# Patient Record
Sex: FEMALE | Race: WHITE | URBAN - METROPOLITAN AREA
[De-identification: names, ages, dates, MRNs, and addresses within clinical notes are randomized per-mention and may not be internally consistent; named-entity substitution may affect disease eponyms.]

---

## 2019-01-21 ENCOUNTER — OFFICE VISIT (OUTPATIENT)
Dept: FAMILY MEDICINE CLINIC | Age: 29
End: 2019-01-21

## 2019-01-21 VITALS
WEIGHT: 241 LBS | SYSTOLIC BLOOD PRESSURE: 123 MMHG | TEMPERATURE: 98.5 F | RESPIRATION RATE: 17 BRPM | BODY MASS INDEX: 40.15 KG/M2 | DIASTOLIC BLOOD PRESSURE: 82 MMHG | OXYGEN SATURATION: 98 % | HEIGHT: 65 IN | HEART RATE: 96 BPM

## 2019-01-21 DIAGNOSIS — F41.1 GAD (GENERALIZED ANXIETY DISORDER): ICD-10-CM

## 2019-01-21 DIAGNOSIS — J45.40 MODERATE PERSISTENT ASTHMA WITHOUT COMPLICATION: Primary | ICD-10-CM

## 2019-01-21 DIAGNOSIS — E66.01 OBESITY, MORBID (HCC): ICD-10-CM

## 2019-01-21 DIAGNOSIS — F41.0 PANIC ATTACK: ICD-10-CM

## 2019-01-21 RX ORDER — CLONAZEPAM 0.5 MG/1
0.5 TABLET ORAL 2 TIMES DAILY
COMMUNITY

## 2019-01-21 RX ORDER — IPRATROPIUM BROMIDE AND ALBUTEROL SULFATE 2.5; .5 MG/3ML; MG/3ML
3 SOLUTION RESPIRATORY (INHALATION)
COMMUNITY
End: 2019-01-21

## 2019-01-21 RX ORDER — BUDESONIDE AND FORMOTEROL FUMARATE DIHYDRATE 160; 4.5 UG/1; UG/1
1 AEROSOL RESPIRATORY (INHALATION) 2 TIMES DAILY
Qty: 1 INHALER | Refills: 2 | Status: SHIPPED | OUTPATIENT
Start: 2019-01-21 | End: 2019-07-15 | Stop reason: SDUPTHER

## 2019-01-21 RX ORDER — IPRATROPIUM BROMIDE AND ALBUTEROL SULFATE 2.5; .5 MG/3ML; MG/3ML
3 SOLUTION RESPIRATORY (INHALATION)
Qty: 60 NEBULE | Refills: 2 | Status: CANCELLED | OUTPATIENT
Start: 2019-01-21

## 2019-01-21 RX ORDER — HYDROXYZINE PAMOATE 50 MG/1
50 CAPSULE ORAL
COMMUNITY
End: 2022-09-26

## 2019-01-21 RX ORDER — HYDROXYZINE PAMOATE 50 MG/1
50 CAPSULE ORAL
Qty: 120 CAP | Refills: 1 | Status: CANCELLED | OUTPATIENT
Start: 2019-01-21

## 2019-01-21 RX ORDER — ALBUTEROL SULFATE 90 UG/1
1 AEROSOL, METERED RESPIRATORY (INHALATION)
Qty: 1 INHALER | Refills: 1 | Status: SHIPPED | OUTPATIENT
Start: 2019-01-21 | End: 2019-10-07 | Stop reason: SDUPTHER

## 2019-01-21 NOTE — PROGRESS NOTES
[address]     Chief Complaint   Patient presents with    Establish Care    Asthma    Anxiety     Vitals:    01/21/19 1406   BP: 123/82   Pulse: 96   Resp: 17   Temp: 98.5 °F (36.9 °C)   TempSrc: Oral   SpO2: 98%   Weight: 241 lb (109.3 kg)   Height: 5' 5\" (1.651 m)   PainSc:   0 - No pain         HPI: Patient is here to establish care with new PCP, and she would like to address her asthma, she recently received insurance, she has been taking DuoNeb nebulizer to treat her asthma symptoms, she never used  steroid inhalers. Asthma is a moderate persistent, her symptoms are always present she was coughing with wheezing. 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 She has been stable on clonazepam 0.5 mg twice daily and Vistaril 50 mg twice daily. Patient did not want to use SSRIs since she has tried couple and they cause her to have many symptoms. Past Medical History:   Diagnosis Date    Anxiety     Asthma     Psychotic disorder ([facility] [address]      History reviewed. No pertinent surgical history. Social History     Tobacco Use    Smoking status: Never Smoker    Smokeless tobacco: Never Used   Substance Use Topics    Alcohol use: No     Frequency: Never     Comment: occasionally       Family History   Problem Relation Age of Onset    Hypertension Mother        Review of Systems   Constitutional: Negative for chills, fever, malaise/fatigue and weight loss. HENT: Negative for congestion, ear discharge, ear pain, hearing loss, nosebleeds, sinus pain and sore throat. Eyes: Negative for blurred vision, double vision and discharge. Respiratory: Positive for cough, shortness of breath and wheezing. Cardiovascular: Negative for chest pain, palpitations, claudication and leg swelling. Gastrointestinal: Negative for abdominal pain, constipation, diarrhea, nausea and vomiting. Genitourinary: Negative for dysuria, frequency, hematuria and urgency. Musculoskeletal: Negative for myalgias. Skin: Negative for itching and rash. Neurological: Negative for dizziness, tingling, sensory change, speech change, focal weakness, seizures, weakness and headaches. Psychiatric/Behavioral: Negative for depression, substance abuse and suicidal ideas. The patient is nervous/anxious. Physical Exam   Constitutional: She is oriented to person, place, and time. She appears well-developed and well-nourished. No distress. HENT:   Head: Normocephalic and atraumatic. Mouth/Throat: Oropharynx is clear and moist. No oropharyngeal exudate. Eyes: Conjunctivae are normal. Pupils are equal, round, and reactive to light. Right eye exhibits no discharge. Left eye exhibits no discharge. No scleral icterus. Neck: Neck supple. No thyromegaly present. Cardiovascular: Normal rate, regular rhythm and normal heart sounds. No murmur heard. Pulmonary/Chest: Effort normal. No respiratory distress. She has wheezes. She has no rales. She exhibits no tenderness. Abdominal: Soft. She exhibits no distension. There is no tenderness. There is no rebound. Musculoskeletal: Normal range of motion. She exhibits no edema, tenderness or deformity. Lymphadenopathy:     She has no cervical adenopathy. Neurological: She is alert and oriented to person, place, and time. No cranial nerve deficit. Coordination normal.   Skin: Skin is warm and dry. No rash noted. She is not diaphoretic. No erythema. No pallor. Psychiatric: She has a normal mood and affect. Her behavior is normal. Judgment and thought content normal.   Nursing note and vitals reviewed. Assessment and plan     Plan of care has been discussed with the patient, he agrees to the plan and verbalized understanding.   All his questions were answered  More than 50% of the time spent in this visit was counseling the patient about  illness and treatment options         1. Moderate persistent asthma without complication    Advised to use Symbicort twice daily and before as needed follow-up in 1 month for reevaluation  - budesonide-formoterol (SYMBICORT) 160-4.5 mcg/actuation HFAA; Take 1 Puff by inhalation two (2) times a day. Dispense: 1 Inhaler; Refill: 2  - albuterol (PROVENTIL HFA, VENTOLIN HFA, PROAIR HFA) 90 mcg/actuation inhaler; Take 1 Puff by inhalation every six (6) hours as needed for Wheezing. Dispense: 1 Inhaler; Refill: 1    2. ERIC (generalized anxiety disorder)    - REFERRAL TO PSYCHIATRY    3. Panic attack    - REFERRAL TO PSYCHIATRY    4. Obesity, morbid ([facility] [address]  I have reviewed/discussed the above normal BMI with the patient. I have recommended the following interventions: dietary management education, guidance, and counseling, encourage exercise and monitor weight . Annamary Ripa Current Outpatient Medications   Medication Sig Dispense Refill    clonazePAM (KLONOPIN) 0.5 mg tablet Take  by mouth two (2) times a day.  hydrOXYzine pamoate (VISTARIL) 50 mg capsule Take 50 mg by mouth three (3) times daily as needed for Anxiety.  budesonide-formoterol (SYMBICORT) 160-4.5 mcg/actuation HFAA Take 1 Puff by inhalation two (2) times a day. 1 Inhaler 2    albuterol (PROVENTIL HFA, VENTOLIN HFA, PROAIR HFA) 90 mcg/actuation inhaler Take 1 Puff by inhalation every six (6) hours as needed for Wheezing. 1 Inhaler 1       Patient Active Problem List    Diagnosis Date Noted    Obesity, morbid ([address] 01/21/2019    ERIC (generalized anxiety disorder) 01/21/2019    Panic attack 01/21/2019    Moderate persistent asthma without complication 84/78/2975     No results found for this or any previous visit. No results found for any previous visit. Follow-up Disposition:  Return in about 1 month (around 2/21/2019).

## 2019-01-21 NOTE — PROGRESS NOTES
Donny Funes is a 33 y.o. female presents to office for establish care, asthma, anxiety    Medication list has been reviewed with Donny Funes and updated as of today's date     Health Maintenance items with a due date reviewed with patient:  Health Maintenance Due   Topic Date Due    DTaP/Tdap/Td series (1 - Tdap) 10/26/2010    PAP AKA CERVICAL CYTOLOGY  10/26/2010    Influenza Age 9 to Adult  08/01/2018    MEDICARE YEARLY EXAM  01/17/2019

## 2019-02-25 ENCOUNTER — HOSPITAL ENCOUNTER (OUTPATIENT)
Dept: LAB | Age: 29
Discharge: HOME | End: 2019-02-25
Payer: COMMERCIAL

## 2019-02-25 ENCOUNTER — OFFICE VISIT (OUTPATIENT)
Dept: FAMILY MEDICINE CLINIC | Age: 29
End: 2019-02-25

## 2019-02-25 VITALS
HEART RATE: 102 BPM | WEIGHT: 239.6 LBS | HEIGHT: 65 IN | BODY MASS INDEX: 39.92 KG/M2 | TEMPERATURE: 97.5 F | DIASTOLIC BLOOD PRESSURE: 83 MMHG | SYSTOLIC BLOOD PRESSURE: 130 MMHG | OXYGEN SATURATION: 94 %

## 2019-02-25 DIAGNOSIS — J45.40 MODERATE PERSISTENT ASTHMA WITHOUT COMPLICATION: ICD-10-CM

## 2019-02-25 DIAGNOSIS — J02.9 SORE THROAT: Primary | ICD-10-CM

## 2019-02-25 DIAGNOSIS — J02.9 PHARYNGITIS, UNSPECIFIED ETIOLOGY: ICD-10-CM

## 2019-02-25 DIAGNOSIS — F41.1 GAD (GENERALIZED ANXIETY DISORDER): ICD-10-CM

## 2019-02-25 LAB
S PYO AG THROAT QL: NEGATIVE
VALID INTERNAL CONTROL?: YES

## 2019-02-25 PROCEDURE — 87070 CULTURE OTHR SPECIMN AEROBIC: CPT

## 2019-02-25 NOTE — PROGRESS NOTES
Ezra Bautista is a 33 y.o. female presents in office follow up from symbicort, sore throat, itchy/red, and eye discharge x 2 1/2 weeks Health Maintenance Due Topic Date Due  Pneumococcal 19-64 Medium Risk (1 of 1 - PPSV23) 10/26/2008  DTaP/Tdap/Td series (1 - Tdap) 10/26/2010  PAP AKA CERVICAL CYTOLOGY  10/26/2010  Influenza Age 5 to Adult  08/01/2018 1. Have you been to the ER, urgent care clinic since your last visit? Hospitalized since your last visit? Yes When: 2/5/2019 to Urgent Care for Strep 2. Have you seen or consulted any other health care providers outside of the [address] [place] [address] since your last visit? Include any pap smears or colon screening.  No

## 2019-02-25 NOTE — PROGRESS NOTES
Jie Real Chief Complaint Patient presents with  Follow-up  
  from being on symbicort Vitals:  
 02/25/19 1020 BP: 130/83 Pulse: (!) 102 Temp: 97.5 °F (36.4 °C) TempSrc: Oral  
SpO2: 94% Weight: 239 lb 9.6 oz (108.7 kg) Height: 5' 5\" (1.651 m) PainSc:   3 PainLoc: Throat HPI: Patient here to follow-up on asthma and says she has been started on Symbicort 1 inhaler twice daily, patient has been doing much better she barely use her DuoNeb nebulizer, and she had used albuterol inhaler once or twice a week. She has been having sore throat started earlier this months and she visited 1 of the urgent care and she was diagnosed with a strep infection was treated with penicillin tablets for 7 days, patient symptoms got better but a few days later she started having sore throat pain with swallowing body aches no fever no nausea no vomiting no chills. Regarding her anxiety patient did get a hold of her previous provider  Sal Valadez . Past Medical History:  
Diagnosis Date  Anxiety  Asthma  Psychotic disorder ([facility] [address] History reviewed. No pertinent surgical history. Social History Tobacco Use  Smoking status: Never Smoker  Smokeless tobacco: Never Used Substance Use Topics  Alcohol use: Yes Frequency: Never Comment: occasionally Family History Problem Relation Age of Onset  Hypertension Mother  Anxiety Mother  Anxiety Father  Alcohol abuse Father Review of Systems Constitutional: Negative for chills, fever, malaise/fatigue and weight loss. HENT: Positive for sore throat. Negative for congestion, ear discharge, ear pain, hearing loss, nosebleeds and sinus pain. Eyes: Negative for blurred vision, double vision and discharge. Respiratory: Positive for cough and wheezing. Negative for hemoptysis. Cardiovascular: Negative for chest pain, palpitations, claudication and leg swelling. Gastrointestinal: Negative for abdominal pain, constipation, diarrhea, nausea and vomiting. Genitourinary: Negative for dysuria, frequency and urgency. Musculoskeletal: Negative for back pain, joint pain, myalgias and neck pain. Skin: Negative for itching and rash. Neurological: Negative for dizziness, tingling, sensory change, speech change, focal weakness, weakness and headaches. Psychiatric/Behavioral: Negative for depression, hallucinations, substance abuse and suicidal ideas. The patient is not nervous/anxious. Physical Exam  
Constitutional: She is oriented to person, place, and time. She appears well-developed and well-nourished. No distress. HENT:  
Head: Normocephalic and atraumatic. Mouth/Throat: Oropharynx is clear and moist. No oropharyngeal exudate. Bilateral throat erythema no exudates Eyes: Conjunctivae are normal. Pupils are equal, round, and reactive to light. Right eye exhibits no discharge. Left eye exhibits no discharge. No scleral icterus. Neck: Normal range of motion. Neck supple. No thyromegaly present. Left-sided submandibular lymph node tenderness Cardiovascular: Normal rate, regular rhythm and normal heart sounds. No murmur heard. Pulmonary/Chest: Effort normal and breath sounds normal. No respiratory distress. She has no wheezes. She has no rales. She exhibits no tenderness. Abdominal: Soft. She exhibits no distension. There is no tenderness. There is no rebound. Musculoskeletal: Normal range of motion. She exhibits no edema, tenderness or deformity. Lymphadenopathy:  
  She has no cervical adenopathy. Neurological: She is alert and oriented to person, place, and time. No cranial nerve deficit. Coordination normal.  
Skin: Skin is warm and dry. No rash noted. She is not diaphoretic. No erythema. No pallor. Psychiatric: She has a normal mood and affect.  Her behavior is normal. Judgment and thought content normal.  
 Nursing note and vitals reviewed. Assessment and plan  
 
Plan of care has been discussed with the patient, he agrees to the plan and verbalized understanding. All his questions were answered More than 50% of the time spent in this visit was counseling the patient about  illness and treatment options 1. Sore throat Culture will be sent strep a rapid antigen is negative - AMB POC RAPID STREP A 
- THROAT CULTURE; Future - THROAT CULTURE 2. Moderate persistent asthma without complication Stable continue Symbicort twice daily 3. ERIC (generalized anxiety disorder) Be following up with her psychiatrist 
 
4. Pharyngitis, unspecified etiology Strep antigen negative we will send for culture patient advised to take lozenges gargle salt and water and stay well-hydrated with 3 L a day of water. Current Outpatient Medications Medication Sig Dispense Refill  clonazePAM (KLONOPIN) 0.5 mg tablet Take  by mouth two (2) times a day.  hydrOXYzine pamoate (VISTARIL) 50 mg capsule Take 50 mg by mouth three (3) times daily as needed for Anxiety.  budesonide-formoterol (SYMBICORT) 160-4.5 mcg/actuation HFAA Take 1 Puff by inhalation two (2) times a day. 1 Inhaler 2  
 albuterol (PROVENTIL HFA, VENTOLIN HFA, PROAIR HFA) 90 mcg/actuation inhaler Take 1 Puff by inhalation every six (6) hours as needed for Wheezing. 1 Inhaler 1 Patient Active Problem List  
 Diagnosis Date Noted  Obesity, morbid ([facility] [address] 01/21/2019  ERIC (generalized anxiety disorder) 01/21/2019  Panic attack 01/21/2019  Moderate persistent asthma without complication 37/76/1141 Results for orders placed or performed in visit on 02/25/19 AMB POC RAPID STREP A Result Value Ref Range VALID INTERNAL CONTROL POC Yes Group A Strep Ag Negative Negative Office Visit on 02/25/2019 Component Date Value Ref Range Status  VALID INTERNAL CONTROL POC 02/25/2019 Yes   Final  
  Group A Strep Ag 02/25/2019 Negative  Negative Final  
  
 
 
Follow-up Disposition: Not on File

## 2019-02-27 LAB
BACTERIA SPEC CULT: NORMAL
BACTERIA SPEC CULT: NORMAL
SERVICE CMNT-IMP: NORMAL

## 2019-03-07 ENCOUNTER — TELEPHONE (OUTPATIENT)
Dept: FAMILY MEDICINE CLINIC | Age: 29
End: 2019-03-07

## 2019-03-07 NOTE — TELEPHONE ENCOUNTER
Attempted to contact patient but no answer, message left on VM. Will try again later.  Thank you  Ivania Collier LPN

## 2019-03-07 NOTE — TELEPHONE ENCOUNTER
----- Message from Braxton Todd MD sent at 2/28/2019  7:49 PM EST -----  Normal throat culture negative for infection

## 2019-07-15 DIAGNOSIS — J45.40 MODERATE PERSISTENT ASTHMA WITHOUT COMPLICATION: ICD-10-CM

## 2019-07-15 NOTE — TELEPHONE ENCOUNTER
Pt calling to request med refill of:  Requested Prescriptions     Pending Prescriptions Disp Refills    budesonide-formoterol (SYMBICORT) 160-4.5 mcg/actuation HFAA 1 Inhaler 2     Sig: Take 1 Puff by inhalation two (2) times a day. Be sent to [facility]    Pt has 7 day remaining     Pts last appt was 2/25/19  & No f/u scheduled    Pt advised of 72 hour time frame. Please advise.

## 2019-07-16 RX ORDER — BUDESONIDE AND FORMOTEROL FUMARATE DIHYDRATE 160; 4.5 UG/1; UG/1
1 AEROSOL RESPIRATORY (INHALATION) 2 TIMES DAILY
Qty: 1 INHALER | Refills: 2 | Status: SHIPPED | OUTPATIENT
Start: 2019-07-16 | End: 2020-01-20 | Stop reason: SDUPTHER

## 2019-10-07 ENCOUNTER — OFFICE VISIT (OUTPATIENT)
Dept: FAMILY MEDICINE CLINIC | Age: 29
End: 2019-10-07

## 2019-10-07 VITALS
WEIGHT: 235 LBS | DIASTOLIC BLOOD PRESSURE: 82 MMHG | OXYGEN SATURATION: 96 % | SYSTOLIC BLOOD PRESSURE: 121 MMHG | RESPIRATION RATE: 18 BRPM | HEIGHT: 65 IN | BODY MASS INDEX: 39.15 KG/M2 | TEMPERATURE: 99 F | HEART RATE: 83 BPM

## 2019-10-07 DIAGNOSIS — J45.40 MODERATE PERSISTENT ASTHMA WITHOUT COMPLICATION: ICD-10-CM

## 2019-10-07 DIAGNOSIS — F41.0 PANIC ATTACK: ICD-10-CM

## 2019-10-07 DIAGNOSIS — E28.2 PCOS (POLYCYSTIC OVARIAN SYNDROME): ICD-10-CM

## 2019-10-07 DIAGNOSIS — F41.1 GAD (GENERALIZED ANXIETY DISORDER): Primary | ICD-10-CM

## 2019-10-07 DIAGNOSIS — E66.01 OBESITY, MORBID (HCC): ICD-10-CM

## 2019-10-07 RX ORDER — NORGESTIMATE AND ETHINYL ESTRADIOL 0.25-0.035
KIT ORAL
Refills: 5 | COMMUNITY
Start: 2019-09-26

## 2019-10-07 RX ORDER — METFORMIN HYDROCHLORIDE 500 MG/1
TABLET, EXTENDED RELEASE ORAL
Refills: 1 | COMMUNITY
Start: 2019-09-26 | End: 2020-08-19

## 2019-10-07 NOTE — PROGRESS NOTES
[address]     Chief Complaint   Patient presents with    Asthma     f/u    Anxiety     f/u     Vitals:    10/07/19 0927   BP: 121/82   Pulse: 83   Resp: 18   Temp: 99 °F (37.2 °C)   TempSrc: Oral   SpO2: 96%   Weight: 235 lb (106.6 kg)   Height: 5' 5\" (1.651 m)   PainSc:   0 - No pain   LMP: 09/18/2019         HPI: Patient is here for follow-up medication refill on albuterol takes as needed for asthma, her asthma is mild intermittent, patient has no acute complaint today. She does have anxiety disorder she is on clonazepam 0.5 mg as needed as well as vistaril. Breasts are also help with her allergy symptoms. Patient was diagnosed with polycystic ovarian syndrome at [facility], requested to get record a release of information to be signed. Patient is due for Pap smear she will schedule one . For next visit    Past Medical History:   Diagnosis Date    Anxiety     Asthma     Psychotic disorder ([facility] [address]      History reviewed. No pertinent surgical history. Social History     Tobacco Use    Smoking status: Never Smoker    Smokeless tobacco: Never Used   Substance Use Topics    Alcohol use: Yes     Frequency: Never     Comment: occasionally       Family History   Problem Relation Age of Onset    Hypertension Mother     Anxiety Mother     Anxiety Father     Alcohol abuse Father        Review of Systems   Constitutional: Negative for chills, fever, malaise/fatigue and weight loss. HENT: Negative for congestion, ear discharge, ear pain, hearing loss, nosebleeds, sinus pain and sore throat. Eyes: Negative for blurred vision, double vision and discharge. Respiratory: Negative for cough, hemoptysis, sputum production, shortness of breath and wheezing. Cardiovascular: Negative for chest pain, palpitations, claudication and leg swelling. Gastrointestinal: Negative for abdominal pain, blood in stool, constipation, diarrhea, nausea and vomiting.    Genitourinary: Negative for dysuria, frequency, hematuria and urgency. Musculoskeletal: Negative for back pain, myalgias and neck pain. Skin: Negative for itching and rash. Neurological: Negative for dizziness, tingling, sensory change, speech change, focal weakness, seizures, weakness and headaches. Psychiatric/Behavioral: Negative for depression, hallucinations, substance abuse and suicidal ideas. The patient is not nervous/anxious and does not have insomnia. Physical Exam   Constitutional: She is oriented to person, place, and time. She appears well-developed and well-nourished. No distress. HENT:   Head: Normocephalic and atraumatic. Mouth/Throat: Oropharynx is clear and moist. No oropharyngeal exudate. Eyes: Pupils are equal, round, and reactive to light. Conjunctivae are normal. Right eye exhibits no discharge. Left eye exhibits no discharge. No scleral icterus. Neck: No thyromegaly present. Cardiovascular: Normal rate, regular rhythm and normal heart sounds. No murmur heard. Pulmonary/Chest: Effort normal and breath sounds normal. No respiratory distress. She has no wheezes. She has no rales. She exhibits no tenderness. Abdominal: Soft. She exhibits no distension. There is no tenderness. There is no rebound. Musculoskeletal: Normal range of motion. She exhibits no edema, tenderness or deformity. Lymphadenopathy:     She has no cervical adenopathy. Neurological: She is alert and oriented to person, place, and time. No cranial nerve deficit. Coordination normal.   Skin: Skin is warm and dry. No rash noted. She is not diaphoretic. No erythema. No pallor. Psychiatric: She has a normal mood and affect. Her behavior is normal. Judgment and thought content normal.   Nursing note and vitals reviewed. Assessment and plan     Plan of care has been discussed with the patient, he agrees to the plan and verbalized understanding.   All his questions were answered  More than 50% of the time spent in this visit was counseling the patient about  illness and treatment options         1. ERIC (generalized anxiety disorder)      2. Obesity, morbid ([address]    3. Panic attack      4. PCOS (polycystic ovarian syndrome)    5. Moderate persistent asthma without complication    - albuterol (PROVENTIL HFA, VENTOLIN HFA, PROAIR HFA) 90 mcg/actuation inhaler; Take 1 Puff by inhalation every six (6) hours as needed for Wheezing. Dispense: 1 Inhaler; Refill: 3    Current Outpatient Medications   Medication Sig Dispense Refill    SPRINTEC, 28, 0.25-35 mg-mcg tab TAKE 1 TABLET BY MOUTH ONCE DAILY  5    metFORMIN ER (GLUCOPHAGE XR) 500 mg tablet TAKE 1 TABLET BY MOUTH ONCE DAILY WITH EVENING MEAL  1    budesonide-formoterol (SYMBICORT) 160-4.5 mcg/actuation HFAA Take 1 Puff by inhalation two (2) times a day. 1 Inhaler 2    clonazePAM (KLONOPIN) 0.5 mg tablet Take 0.5 mg by mouth two (2) times a day.  hydrOXYzine pamoate (VISTARIL) 50 mg capsule Take 50 mg by mouth three (3) times daily as needed for Anxiety.  albuterol (PROVENTIL HFA, VENTOLIN HFA, PROAIR HFA) 90 mcg/actuation inhaler Take 1 Puff by inhalation every six (6) hours as needed for Wheezing. 1 Inhaler 1       Patient Active Problem List    Diagnosis Date Noted    Obesity, morbid ([address] 01/21/2019    ERIC (generalized anxiety disorder) 01/21/2019    Panic attack 01/21/2019    Moderate persistent asthma without complication 87/50/1379     Results for orders placed or performed during the hospital encounter of 02/25/19   THROAT CULTURE   Result Value Ref Range    Special Requests: NO SPECIAL REQUESTS      Culture result: NO BETA HEMOLYTIC STREPTOCOCCUS GROUP A ISOLATED      Culture result: MODERATE NORMAL RESPIRATORY NAHID       No visits with results within 3 Month(s) from this visit.    Latest known visit with results is:   Hospital Outpatient Visit on 02/25/2019   Component Date Value Ref Range Status    Special Requests: 02/25/2019 NO SPECIAL REQUESTS    Final    Culture result: 02/25/2019 NO BETA HEMOLYTIC STREPTOCOCCUS GROUP A ISOLATED    Final    Culture result: 02/25/2019 MODERATE NORMAL RESPIRATORY NAHID    Final          Follow-up and Dispositions    · Return for for annual physical, for well women exam.

## 2019-10-07 NOTE — PROGRESS NOTES
Tessie Smiley is a 33 y.o. female presents in office for asthma and anxiety. Health Maintenance Due   Topic Date Due    Pneumococcal 0-64 years (1 of 1 - PPSV23) 10/26/1995    DTaP/Tdap/Td series (1 - Tdap) 10/26/2010    PAP AKA CERVICAL CYTOLOGY  10/26/2010       1. Have you been to the ER, urgent care clinic since your last visit? Hospitalized since your last visit? No    2. Have you seen or consulted any other health care providers outside of the [facility] since your last visit? Include any pap smears or colon screening. No    Patient follows with gyn for pap smear at [address] [place] [address]. She has an appointment next month. She declines flu vaccine today.

## 2019-10-08 RX ORDER — ALBUTEROL SULFATE 90 UG/1
1 AEROSOL, METERED RESPIRATORY (INHALATION)
Qty: 1 INHALER | Refills: 3 | Status: SHIPPED | OUTPATIENT
Start: 2019-10-08 | End: 2020-08-14 | Stop reason: SDUPTHER

## 2020-01-20 DIAGNOSIS — J45.40 MODERATE PERSISTENT ASTHMA WITHOUT COMPLICATION: ICD-10-CM

## 2020-01-20 RX ORDER — BUDESONIDE AND FORMOTEROL FUMARATE DIHYDRATE 160; 4.5 UG/1; UG/1
1 AEROSOL RESPIRATORY (INHALATION) 2 TIMES DAILY
Qty: 1 INHALER | Refills: 2 | Status: SHIPPED | OUTPATIENT
Start: 2020-01-20 | End: 2020-04-30

## 2020-01-20 NOTE — TELEPHONE ENCOUNTER
Patient called requesting medication refill. Please advise. Requested Prescriptions     Pending Prescriptions Disp Refills    budesonide-formoteroL (SYMBICORT) 160-4.5 mcg/actuation HFAA 1 Inhaler 2     Sig: Take 1 Puff by inhalation two (2) times a day.

## 2020-04-30 DIAGNOSIS — J45.40 MODERATE PERSISTENT ASTHMA WITHOUT COMPLICATION: ICD-10-CM

## 2020-04-30 RX ORDER — BUDESONIDE AND FORMOTEROL FUMARATE DIHYDRATE 160; 4.5 UG/1; UG/1
AEROSOL RESPIRATORY (INHALATION)
Qty: 3 INHALER | Refills: 0 | Status: SHIPPED | OUTPATIENT
Start: 2020-04-30 | End: 2020-08-13 | Stop reason: SDUPTHER

## 2020-08-13 DIAGNOSIS — J45.40 MODERATE PERSISTENT ASTHMA WITHOUT COMPLICATION: ICD-10-CM

## 2020-08-13 RX ORDER — BUDESONIDE AND FORMOTEROL FUMARATE DIHYDRATE 160; 4.5 UG/1; UG/1
AEROSOL RESPIRATORY (INHALATION)
Qty: 3 INHALER | Refills: 0 | Status: SHIPPED | OUTPATIENT
Start: 2020-08-13 | End: 2021-01-06 | Stop reason: SDUPTHER

## 2020-08-13 NOTE — TELEPHONE ENCOUNTER
Last OV was 10/07/2019 and last refill was 04/30/2020.     Future Appointments   Date Time Provider Sulema Alessia   8/19/2020  2:15 PM MD Fei Butler [facility]

## 2020-08-13 NOTE — TELEPHONE ENCOUNTER
Patient called requesting a medication refill on her maintenance inhaler and wanted to know if dr. Thomas Spears was able to refill before her upcoming appointment.  Please advise      Requested Prescriptions     Pending Prescriptions Disp Refills    Symbicort 160-4.5 mcg/actuation HFAA 3 Inhaler 0

## 2020-08-14 DIAGNOSIS — J45.40 MODERATE PERSISTENT ASTHMA WITHOUT COMPLICATION: ICD-10-CM

## 2020-08-14 NOTE — TELEPHONE ENCOUNTER
Requested Prescriptions     Pending Prescriptions Disp Refills    albuterol (PROVENTIL HFA, VENTOLIN HFA, PROAIR HFA) 90 mcg/actuation inhaler 1 Inhaler 3     Sig: Take 1 Puff by inhalation every six (6) hours as needed for Wheezing.

## 2020-08-17 RX ORDER — ALBUTEROL SULFATE 90 UG/1
1 AEROSOL, METERED RESPIRATORY (INHALATION)
Qty: 1 INHALER | Refills: 3 | Status: SHIPPED | OUTPATIENT
Start: 2020-08-17 | End: 2022-09-16 | Stop reason: SDUPTHER

## 2020-08-17 NOTE — TELEPHONE ENCOUNTER
Patient last appointment was 10/08/2019.    Future Appointments   Date Time Provider Sulema Aggarwal   8/19/2020  2:15 PM Nivia Ribeiro MD [facility]

## 2020-08-19 ENCOUNTER — VIRTUAL VISIT (OUTPATIENT)
Dept: FAMILY MEDICINE CLINIC | Age: 30
End: 2020-08-19

## 2020-08-19 ENCOUNTER — TELEPHONE (OUTPATIENT)
Dept: FAMILY MEDICINE CLINIC | Age: 30
End: 2020-08-19

## 2020-08-19 DIAGNOSIS — E66.01 OBESITY, MORBID (HCC): ICD-10-CM

## 2020-08-19 DIAGNOSIS — J45.40 MODERATE PERSISTENT ASTHMA WITHOUT COMPLICATION: Primary | ICD-10-CM

## 2020-08-19 DIAGNOSIS — R73.09 ABNORMAL BLOOD SUGAR: ICD-10-CM

## 2020-08-19 DIAGNOSIS — F41.0 PANIC ATTACK: ICD-10-CM

## 2020-08-19 DIAGNOSIS — R19.7 DIARRHEA, UNSPECIFIED TYPE: ICD-10-CM

## 2020-08-19 DIAGNOSIS — F41.1 GAD (GENERALIZED ANXIETY DISORDER): ICD-10-CM

## 2020-08-19 NOTE — PROGRESS NOTES
nAa Ortiz is a 26 y.o. female who was seen by synchronous (real-time) audio-video technology on 8/19/2020 for Medication Evaluation and Follow-up    Patient is here for follow up asthma has been stable since she started Symbicort she is using albuterol rarely,     patient had Pap smear done at  [facility] as well as blood work last year will request records        Has been having diarrhea for 1 month it is not related to food , even when she does not eat she still have diarrhea , bloating and abdominal pain 10 to 12 times a day , patient has tried eliminating dairy and sugar with no improvement in diarrhea    Assessment & Plan:   Diagnoses and all orders for this visit:    1. Moderate persistent asthma without complication  Stable on current medications    2. ERIC (generalized anxiety disorder)    Patient is following up with psychiatrist she is on Klonopin and hydroxyzine which is helping her symptoms  3. Panic attack    Stable on current treatment    4. Obesity, morbid ([address]  Patient has lost like 15 pounds by lifestyle modification    5. Abnormal blood sugar     she was given metformin for prediabetes could not tolerate it, to recheck in her    Physical      6. Diarrhea, unspecified type    I have advised the patient to eliminate gluten from her diet for 1 week, and also take Beano 2 tablets before meal and reevaluate her symptoms in 1 months, if no improvement consider evaluation by GI      712  Subjective:       Prior to Admission medications    Medication Sig Start Date End Date Taking? Authorizing Provider   albuterol (PROVENTIL HFA, VENTOLIN HFA, PROAIR HFA) 90 mcg/actuation inhaler Take 1 Puff by inhalation every six (6) hours as needed for Wheezing.  8/17/20  Yes Kathy Nunez MD   Symbicort 160-4.5 mcg/actuation HFAA INHALE 1 PUFF BY MOUTH TWICE DAILY 8/13/20  Yes Kathy Nunez MD   [facility], , 0.25-35 mg-mcg tab TAKE 1 TABLET BY MOUTH ONCE DAILY 9/26/19  Yes Provider, Historical clonazePAM (KLONOPIN) 0.5 mg tablet Take 0.5 mg by mouth two (2) times a day. Yes Provider, Historical   hydrOXYzine pamoate (VISTARIL) 50 mg capsule Take 50 mg by mouth three (3) times daily as needed for Anxiety. Yes Provider, Historical   metFORMIN ER (GLUCOPHAGE XR) 500 mg tablet TAKE 1 TABLET BY MOUTH ONCE DAILY WITH EVENING MEAL 9/26/19 8/19/20  Provider, Historical     Patient Active Problem List   Diagnosis Code    Obesity, morbid ([facility] E66.01    ERIC (generalized anxiety disorder) F41.1    Panic attack F41.0    Moderate persistent asthma without complication B76.91     Patient Active Problem List    Diagnosis Date Noted    Obesity, morbid ([facility] 01/21/2019    ERIC (generalized anxiety disorder) 01/21/2019    Panic attack 01/21/2019    Moderate persistent asthma without complication 67/75/2353     Current Outpatient Medications   Medication Sig Dispense Refill    albuterol (PROVENTIL HFA, VENTOLIN HFA, PROAIR HFA) 90 mcg/actuation inhaler Take 1 Puff by inhalation every six (6) hours as needed for Wheezing. 1 Inhaler 3    Symbicort 160-4.5 mcg/actuation HFAA INHALE 1 PUFF BY MOUTH TWICE DAILY 3 Inhaler 0    SPRINTEC, 28, 0.25-35 mg-mcg tab TAKE 1 TABLET BY MOUTH ONCE DAILY  5    clonazePAM (KLONOPIN) 0.5 mg tablet Take 0.5 mg by mouth two (2) times a day.  hydrOXYzine pamoate (VISTARIL) 50 mg capsule Take 50 mg by mouth three (3) times daily as needed for Anxiety. Allergies   Allergen Reactions    Codeine Nausea and Vomiting     Past Medical History:   Diagnosis Date    Anxiety     Asthma     Psychotic disorder ([facility]      No past surgical history on file.   Family History   Problem Relation Age of Onset    Hypertension Mother    [facility] Anxiety Mother     Anxiety Father     Alcohol abuse Father      Social History     Tobacco Use    Smoking status: Never Smoker    Smokeless tobacco: Never Used   Substance Use Topics    Alcohol use: Yes     Frequency: Never     Comment: occasionally       Review of Systems   Constitutional: Negative for chills, fever, malaise/fatigue and weight loss. HENT: Negative for congestion, ear discharge, ear pain, hearing loss and nosebleeds. Eyes: Negative for blurred vision, double vision and discharge. Respiratory: Negative for cough, hemoptysis, sputum production, shortness of breath and wheezing. Cardiovascular: Negative for chest pain, palpitations, claudication and leg swelling. Gastrointestinal: Positive for abdominal pain and diarrhea. Negative for blood in stool, constipation, melena, nausea and vomiting. Genitourinary: Negative for dysuria, frequency and urgency. Musculoskeletal: Negative for back pain, joint pain, myalgias and neck pain. Skin: Negative for itching and rash. Neurological: Negative for dizziness, tingling, sensory change, speech change, focal weakness, weakness and headaches. Psychiatric/Behavioral: Positive for depression. Negative for hallucinations, substance abuse and suicidal ideas. The patient is nervous/anxious. The patient does not have insomnia. Objective:     Patient-Reported Vitals 8/19/2020   Patient-Reported Pulse 220 lb      General: alert, cooperative, no distress   Mental  status: normal mood, behavior, speech, dress, motor activity, and thought processes, able to follow commands   HENT: NCAT   Neck: no visualized mass   Resp: no respiratory distress   Neuro: no gross deficits   Skin: no discoloration or lesions of concern on visible areas   Psychiatric: normal affect, consistent with stated mood, no evidence of hallucinations     Additional exam findings: We discussed the expected course, resolution and complications of the diagnosis(es) in detail. Medication risks, benefits, costs, interactions, and alternatives were discussed as indicated. I advised her to contact the office if her condition worsens, changes or fails to improve as anticipated.  She expressed understanding with the diagnosis(es) and plan. Silcompa Ly, who was evaluated through a patient-initiated, synchronous (real-time) audio-video encounter, and/or her healthcare decision maker, is aware that it is a billable service, with coverage as determined by her insurance carrier. She provided verbal consent to proceed: Yes, and patient identification was verified. It was conducted pursuant to the emergency declaration under the [address] [place] [address] and the [employer] General Act. A caregiver was present when appropriate. Ability to conduct physical exam was limited. I was in the office. The patient was at home.       Juan Manuel Mojica MD

## 2021-01-06 DIAGNOSIS — J45.40 MODERATE PERSISTENT ASTHMA WITHOUT COMPLICATION: ICD-10-CM

## 2021-01-06 NOTE — TELEPHONE ENCOUNTER
Pt called to let us know that the strength of Symbicort that she receives is on back order for 2-3 weeks. Pharmacy instructed her to call and see if  will authorize a lower dose. Please advise.

## 2021-01-06 NOTE — LETTER
1/7/2021 8:27 AM 
 
Ms. Damián Fernandez [phone] Dear Ms. Swanson: 
 
I'm writing you because you are due for an physical.  Please call our office at [phone] and schedule a follow up appointment for your continued care. Sincerely, Bettye Lucero MD

## 2021-01-07 ENCOUNTER — TELEPHONE (OUTPATIENT)
Dept: FAMILY MEDICINE CLINIC | Age: 31
End: 2021-01-07

## 2021-01-07 RX ORDER — BUDESONIDE AND FORMOTEROL FUMARATE DIHYDRATE 160; 4.5 UG/1; UG/1
2 AEROSOL RESPIRATORY (INHALATION) 2 TIMES DAILY
Qty: 6 INHALER | Refills: 0 | Status: SHIPPED | OUTPATIENT
Start: 2021-01-07 | End: 2021-02-14

## 2021-01-07 NOTE — TELEPHONE ENCOUNTER
Dr. Lassiter Canal on the patient's instructions you put take 2 puffs twice a day and you also put take 1 puff twice a day. Is it suppose to be 2 puffs twice a day or 1 puff twice a day. Please advise, thank you.

## 2021-02-13 DIAGNOSIS — J45.40 MODERATE PERSISTENT ASTHMA WITHOUT COMPLICATION: ICD-10-CM

## 2021-02-14 RX ORDER — BUDESONIDE AND FORMOTEROL FUMARATE DIHYDRATE 160; 4.5 UG/1; UG/1
AEROSOL RESPIRATORY (INHALATION)
Qty: 1 INHALER | Refills: 0 | Status: SHIPPED | OUTPATIENT
Start: 2021-02-14 | End: 2021-04-11

## 2021-02-18 ENCOUNTER — OFFICE VISIT (OUTPATIENT)
Dept: FAMILY MEDICINE CLINIC | Age: 31
End: 2021-02-18
Payer: COMMERCIAL

## 2021-02-18 ENCOUNTER — TELEPHONE (OUTPATIENT)
Dept: FAMILY MEDICINE CLINIC | Age: 31
End: 2021-02-18

## 2021-02-18 VITALS
HEIGHT: 67 IN | WEIGHT: 241 LBS | HEART RATE: 98 BPM | DIASTOLIC BLOOD PRESSURE: 84 MMHG | SYSTOLIC BLOOD PRESSURE: 124 MMHG | RESPIRATION RATE: 16 BRPM | OXYGEN SATURATION: 98 % | TEMPERATURE: 97.4 F | BODY MASS INDEX: 37.83 KG/M2

## 2021-02-18 DIAGNOSIS — J45.40 MODERATE PERSISTENT ASTHMA WITHOUT COMPLICATION: ICD-10-CM

## 2021-02-18 DIAGNOSIS — Z00.00 ANNUAL PHYSICAL EXAM: Primary | ICD-10-CM

## 2021-02-18 DIAGNOSIS — F41.0 PANIC ATTACK: ICD-10-CM

## 2021-02-18 DIAGNOSIS — R73.09 ABNORMAL BLOOD SUGAR: ICD-10-CM

## 2021-02-18 DIAGNOSIS — F41.1 GAD (GENERALIZED ANXIETY DISORDER): ICD-10-CM

## 2021-02-18 DIAGNOSIS — E66.01 OBESITY, MORBID (HCC): ICD-10-CM

## 2021-02-18 PROCEDURE — 99395 PREV VISIT EST AGE 18-39: CPT | Performed by: LEGAL MEDICINE

## 2021-02-18 NOTE — PROGRESS NOTES
[address]     Chief Complaint   Patient presents with    Complete Physical     CPE w/o pap     Vitals:    02/18/21 1428   BP: 124/84   Pulse: 98   Resp: 16   Temp: 97.4 °F (36.3 °C)   TempSrc: Temporal   SpO2: 98%   Weight: 241 lb (109.3 kg)   Height: 5' 7\" (1.702 m)   PainSc:   0 - No pain         HPI:Pateint is here for annual physical and blood work , she has been stabel ,asthma controlled using albuterol very rarely. Non smoker   No alcohol not exercising regularly only Walking dogs     She will no be getting pap smear today     Past Medical History:   Diagnosis Date    Anxiety     Asthma     Psychotic disorder ([address]      No past surgical history on file. Social History     Tobacco Use    Smoking status: Never Smoker    Smokeless tobacco: Never Used   Substance Use Topics    Alcohol use: Yes     Frequency: Never     Comment: occasionally       Family History   Problem Relation Age of Onset    Hypertension Mother     Anxiety Mother     Anxiety Father     Alcohol abuse Father        Review of Systems   Constitutional: Negative for chills, fever, malaise/fatigue and weight loss. HENT: Negative for congestion, ear discharge, ear pain, hearing loss, nosebleeds, sinus pain and sore throat. Eyes: Negative for blurred vision, double vision and discharge. Respiratory: Negative for cough, hemoptysis, sputum production, shortness of breath and wheezing. Cardiovascular: Negative for chest pain, palpitations, claudication and leg swelling. Gastrointestinal: Negative for abdominal pain, constipation, diarrhea, nausea and vomiting. Genitourinary: Negative for dysuria, flank pain, frequency, hematuria and urgency. Musculoskeletal: Negative for back pain, falls, joint pain, myalgias and neck pain. Skin: Negative for itching and rash. Neurological: Negative for dizziness, tingling, sensory change, speech change, focal weakness, seizures, loss of consciousness, weakness and headaches. Psychiatric/Behavioral: Negative for depression, hallucinations, memory loss, substance abuse and suicidal ideas. The patient has insomnia. The patient is not nervous/anxious. Physical Exam  Vitals signs and nursing note reviewed. Constitutional:       General: She is not in acute distress. Appearance: She is well-developed. She is not diaphoretic. HENT:      Head: Normocephalic and atraumatic. Eyes:      General: No scleral icterus. Right eye: No discharge. Left eye: No discharge. Conjunctiva/sclera: Conjunctivae normal.      Pupils: Pupils are equal, round, and reactive to light. Neck:      Thyroid: No thyromegaly. Cardiovascular:      Rate and Rhythm: Normal rate and regular rhythm. Heart sounds: Normal heart sounds. No murmur. Pulmonary:      Effort: Pulmonary effort is normal. No respiratory distress. Breath sounds: Normal breath sounds. No wheezing or rales. Chest:      Chest wall: No tenderness. Abdominal:      General: There is no distension. Palpations: Abdomen is soft. Tenderness: There is no abdominal tenderness. There is no rebound. Musculoskeletal: Normal range of motion. General: No tenderness or deformity. Lymphadenopathy:      Cervical: No cervical adenopathy. Skin:     General: Skin is warm and dry. Coloration: Skin is not pale. Findings: No erythema or rash. Neurological:      Mental Status: She is alert and oriented to person, place, and time. Cranial Nerves: No cranial nerve deficit. Coordination: Coordination normal.   Psychiatric:         Behavior: Behavior normal.         Thought Content: Thought content normal.         Judgment: Judgment normal.          Assessment and plan     Plan of care has been discussed with the patient, he agrees to the plan and verbalized understanding.   All his questions were answered  More than 50% of the time spent in this visit was counseling the patient about illness and treatment options         1. ERIC (generalized anxiety disorder)  stable on current medications she is flowing up with psych       2. Annual physical exam    - CBC WITH AUTOMATED DIFF; Future  - METABOLIC PANEL, COMPREHENSIVE; Future  - LIPID PANEL; Future  - TSH 3RD GENERATION; Future    3. Moderate persistent asthma without complication      4. Panic attack  stable on current medications she is flowing up with psych     5. Obesity, morbid ([facility]  Lifestyle modification has been discussed with patient in details, decrease carbohydrates, decrease or eliminate sugar intake, gradually increase physical activity as tolerated to be about 4 to 5 hours a week. 6. Abnormal blood sugar    - HEMOGLOBIN A1C W/O EAG; Future    Current Outpatient Medications   Medication Sig Dispense Refill    Symbicort 160-4.5 mcg/actuation HFAA INHALE 1 PUFF BY MOUTH TWICE DAILY 1 Inhaler 0    albuterol (PROVENTIL HFA, VENTOLIN HFA, PROAIR HFA) 90 mcg/actuation inhaler Take 1 Puff by inhalation every six (6) hours as needed for Wheezing. 1 Inhaler 3    SPRINTEC, 28, 0.25-35 mg-mcg tab TAKE 1 TABLET BY MOUTH ONCE DAILY  5    clonazePAM (KLONOPIN) 0.5 mg tablet Take 0.5 mg by mouth two (2) times a day.  hydrOXYzine pamoate (VISTARIL) 50 mg capsule Take 50 mg by mouth three (3) times daily as needed for Anxiety. Patient Active Problem List    Diagnosis Date Noted    Obesity, morbid ([facility] 01/21/2019    ERIC (generalized anxiety disorder) 01/21/2019    Panic attack 01/21/2019    Moderate persistent asthma without complication 44/34/6653     Results for orders placed or performed during the hospital encounter of 02/25/19   THROAT CULTURE    Specimen: Throat Swab   Result Value Ref Range    Special Requests: NO SPECIAL REQUESTS      Culture result: NO BETA HEMOLYTIC STREPTOCOCCUS GROUP A ISOLATED      Culture result: MODERATE NORMAL RESPIRATORY NAHID       No visits with results within 3 Month(s) from this visit. Latest known visit with results is:   Hospital Outpatient Visit on 02/25/2019   Component Date Value Ref Range Status    Special Requests: 02/25/2019 NO SPECIAL REQUESTS    Final    Culture result: 02/25/2019 NO BETA HEMOLYTIC STREPTOCOCCUS GROUP A ISOLATED    Final    Culture result: 02/25/2019 MODERATE NORMAL RESPIRATORY NAHID    Final          Follow-up and Dispositions    · Return in about 6 months (around 8/18/2021).

## 2021-02-18 NOTE — PROGRESS NOTES
Dara Sue is a 31 y.o. female (: 1990) presenting to address:    Chief Complaint   Patient presents with    Complete Physical     CPE w/o pap       Vitals:    21 1428   Resp: 16   Temp: 97.4 °F (36.3 °C)   TempSrc: Temporal   Weight: 241 lb (109.3 kg)   Height: 5' 7\" (1.702 m)   PainSc:   0 - No pain       Hearing/Vision:   No exam data present    Learning Assessment:     Learning Assessment 2019   PRIMARY LEARNER Patient   PRIMARY LANGUAGE ENGLISH   LEARNER PREFERENCE PRIMARY PICTURES     LISTENING     VIDEOS     DEMONSTRATION   ANSWERED BY patient   RELATIONSHIP SELF     Depression Screening:     3 most recent PHQ Screens 2021   Little interest or pleasure in doing things Several days   Feeling down, depressed, irritable, or hopeless Not at all   Total Score PHQ 2 1     Fall Risk Assessment:     Fall Risk Assessment, last 12 mths 2021   Able to walk? Yes   Fall in past 12 months? 0   Do you feel unsteady? 0   Are you worried about falling 0     Abuse Screening:     Abuse Screening Questionnaire 10/7/2019   Do you ever feel afraid of your partner? N   Are you in a relationship with someone who physically or mentally threatens you? N   Is it safe for you to go home? Y     Coordination of Care Questionaire:   1. Have you been to the ER, urgent care clinic since your last visit? Hospitalized since your last visit? No    2. Have you seen or consulted any other health care providers outside of the [address] [place] [address] since your last visit? Include any pap smears or colon screening. YES, Dentist [facility]    Advanced Directive:   1. Do you have an Advanced Directive? NO    2. Would you like information on Advanced Directives?  NO

## 2021-02-19 NOTE — TELEPHONE ENCOUNTER
Please call patient if she would like to get vaccination for flu and pneumonia when she come for labs

## 2021-03-04 ENCOUNTER — CLINICAL SUPPORT (OUTPATIENT)
Dept: FAMILY MEDICINE CLINIC | Age: 31
End: 2021-03-04
Payer: COMMERCIAL

## 2021-03-04 ENCOUNTER — APPOINTMENT (OUTPATIENT)
Dept: FAMILY MEDICINE CLINIC | Age: 31
End: 2021-03-04

## 2021-03-04 DIAGNOSIS — Z23 NEEDS FLU SHOT: ICD-10-CM

## 2021-03-04 DIAGNOSIS — Z23 ENCOUNTER FOR IMMUNIZATION: Primary | ICD-10-CM

## 2021-03-04 PROCEDURE — 90471 IMMUNIZATION ADMIN: CPT | Performed by: LEGAL MEDICINE

## 2021-03-04 PROCEDURE — 90732 PPSV23 VACC 2 YRS+ SUBQ/IM: CPT | Performed by: LEGAL MEDICINE

## 2021-03-04 NOTE — PROGRESS NOTES
Radha Rojo is a 31 y.o. female who presents for routine immunizations. She denies any symptoms , reactions or allergies that would exclude them from being immunized today. Risks and adverse reactions were discussed and the VIS was given to them. All questions were addressed. She was observed for 5 min post injection. There were no reactions observed.     Raman Bermudez

## 2021-03-05 LAB
ALB/GLOBRATIO, 58C: 1.4 (CALC) (ref 1–2.5)
ALBUMIN SERPL-MCNC: 4.2 G/DL (ref 3.6–5.1)
ALKALINE PHOSPHATASE, TOTAL, 25002000: 71 U/L (ref 31–125)
ALT SERPL-CCNC: 58 U/L (ref 6–29)
AST SERPL W P-5'-P-CCNC: 30 U/L (ref 10–30)
BASOPHILS # BLD: 62 CELLS/UL (ref 0–200)
BASOPHILS NFR BLD: 0.6 %
BILIRUB SERPL-MCNC: 0.3 MG/DL (ref 0.2–1.2)
BUN SERPL-MCNC: 10 MG/DL (ref 7–25)
BUN/CREATININE RATIO,BUCR: ABNORMAL (CALC) (ref 6–22)
CALCIUM SERPL-MCNC: 9.3 MG/DL (ref 8.6–10.2)
CHLORIDE SERPL-SCNC: 102 MMOL/L (ref 98–110)
CHOL/HDL RATIO,CHHDX: 4.5 (CALC)
CHOLEST SERPL-MCNC: 187 MG/DL
CO2 SERPL-SCNC: 23 MMOL/L (ref 20–32)
CREAT SERPL-MCNC: 0.73 MG/DL (ref 0.5–1.1)
EOSINOPHIL # BLD: 494 CELLS/UL (ref 15–500)
EOSINOPHIL NFR BLD: 4.8 %
ERYTHROCYTE [DISTWIDTH] IN BLOOD BY AUTOMATED COUNT: 13.1 % (ref 11–15)
GLOBULIN,GLOB: 3 G/DL (CALC) (ref 1.9–3.7)
GLUCOSE SERPL-MCNC: 89 MG/DL (ref 65–99)
HBA1C MFR BLD HPLC: 5.2 % OF TOTAL HGB
HCT VFR BLD AUTO: 37.2 % (ref 35–45)
HDLC SERPL-MCNC: 42 MG/DL
HGB BLD-MCNC: 12.6 G/DL (ref 11.7–15.5)
LDL-CHOLESTEROL: ABNORMAL MG/DL (CALC)
LYMPHOCYTES # BLD: 4038 CELLS/UL (ref 850–3900)
LYMPHOCYTES NFR BLD: 39.2 %
MCH RBC QN AUTO: 27.7 PG (ref 27–33)
MCHC RBC AUTO-ENTMCNC: 33.9 G/DL (ref 32–36)
MCV RBC AUTO: 81.8 FL (ref 80–100)
MONOCYTES # BLD: 505 CELLS/UL (ref 200–950)
MONOCYTES NFR BLD: 4.9 %
NEUTROPHILS # BLD AUTO: 5202 CELLS/UL (ref 1500–7800)
NEUTROPHILS # BLD: 50.5 %
NON-HDL CHOLESTEROL, 011976: 145 MG/DL (CALC)
PLATELET # BLD AUTO: 454 THOUSAND/UL (ref 140–400)
PMV BLD AUTO: 9.7 FL (ref 7.5–12.5)
POTASSIUM SERPL-SCNC: 4.1 MMOL/L (ref 3.5–5.3)
PROT SERPL-MCNC: 7.2 G/DL (ref 6.1–8.1)
RBC # BLD AUTO: 4.55 MILLION/UL (ref 3.8–5.1)
SODIUM SERPL-SCNC: 137 MMOL/L (ref 135–146)
TRIGL SERPL-MCNC: 471 MG/DL (ref ?–150)
TSH SERPL DL<=0.005 MIU/L-ACNC: 2.99 MIU/L
WBC # BLD AUTO: 10.3 THOUSAND/UL (ref 3.8–10.8)

## 2021-03-15 NOTE — PROGRESS NOTES
CRAFT NOTE  Group Time:1300  The patient attended all of group. Engagement:   Does not engage in activity. Interaction:  Responds to questions or on approach. Isamar Rey No remarkable abnormalities

## 2021-04-09 ENCOUNTER — TELEPHONE (OUTPATIENT)
Dept: FAMILY MEDICINE CLINIC | Age: 31
End: 2021-04-09

## 2021-04-09 DIAGNOSIS — J45.40 MODERATE PERSISTENT ASTHMA WITHOUT COMPLICATION: ICD-10-CM

## 2021-04-09 NOTE — TELEPHONE ENCOUNTER
----- Message from Laly Gray sent at 4/9/2021  8:30 AM EDT -----  Regarding: ph call  Pt is requesting a  ph call regarding some issues she is having [phone]

## 2021-04-11 RX ORDER — BUDESONIDE AND FORMOTEROL FUMARATE DIHYDRATE 160; 4.5 UG/1; UG/1
1 AEROSOL RESPIRATORY (INHALATION) DAILY
Qty: 3 INHALER | Refills: 1 | Status: SHIPPED | OUTPATIENT
Start: 2021-04-11 | End: 2021-04-13 | Stop reason: SDUPTHER

## 2021-04-13 DIAGNOSIS — J45.40 MODERATE PERSISTENT ASTHMA WITHOUT COMPLICATION: ICD-10-CM

## 2021-04-13 NOTE — TELEPHONE ENCOUNTER
[facility] is requesting a refill for Symbiort 160-4/5 MCG Aer  Last refill was 08/17/2020 qty of 1 with 3 refills.   Last appointment was 02/18/2021

## 2021-04-13 NOTE — TELEPHONE ENCOUNTER
Patient has a mole on her upper thigh that she would like to be looked at.   I scheduled her for Thursday at 8:30am

## 2021-04-14 ENCOUNTER — TELEPHONE (OUTPATIENT)
Dept: FAMILY MEDICINE CLINIC | Age: 31
End: 2021-04-14

## 2021-04-14 DIAGNOSIS — J45.40 MODERATE PERSISTENT ASTHMA WITHOUT COMPLICATION: ICD-10-CM

## 2021-04-14 RX ORDER — BUDESONIDE AND FORMOTEROL FUMARATE DIHYDRATE 160; 4.5 UG/1; UG/1
2 AEROSOL RESPIRATORY (INHALATION) 2 TIMES DAILY
Qty: 3 INHALER | Refills: 3 | Status: SHIPPED | OUTPATIENT
Start: 2021-04-14 | End: 2022-06-01

## 2021-04-14 RX ORDER — BUDESONIDE AND FORMOTEROL FUMARATE DIHYDRATE 160; 4.5 UG/1; UG/1
1 AEROSOL RESPIRATORY (INHALATION) DAILY
Qty: 3 INHALER | Refills: 1 | Status: SHIPPED | OUTPATIENT
Start: 2021-04-14 | End: 2021-04-14 | Stop reason: SDUPTHER

## 2021-04-14 NOTE — TELEPHONE ENCOUNTER
[facility] called for clarification on patient's Symbicort to see if you meant for patient to take 1 puff twice a day or does patient needs to take 1 puff once a day. Please advise, thank you.

## 2021-04-14 NOTE — TELEPHONE ENCOUNTER
Franissco from the pharmacy called to clarify the dosage of her inhaler. He says normally it is 2 puffs twice a day so he just wants to make sure its correct.

## 2022-03-18 PROBLEM — F41.0 PANIC ATTACK: Status: ACTIVE | Noted: 2019-01-21

## 2022-03-19 PROBLEM — J45.40 MODERATE PERSISTENT ASTHMA WITHOUT COMPLICATION: Status: ACTIVE | Noted: 2019-01-21

## 2022-03-19 PROBLEM — E66.01 OBESITY, MORBID (HCC): Status: ACTIVE | Noted: 2019-01-21

## 2022-03-19 PROBLEM — F41.1 GAD (GENERALIZED ANXIETY DISORDER): Status: ACTIVE | Noted: 2019-01-21

## 2022-06-01 DIAGNOSIS — J45.40 MODERATE PERSISTENT ASTHMA WITHOUT COMPLICATION: ICD-10-CM

## 2022-06-01 RX ORDER — BUDESONIDE AND FORMOTEROL FUMARATE DIHYDRATE 160; 4.5 UG/1; UG/1
AEROSOL RESPIRATORY (INHALATION)
Qty: 33 G | Refills: 0 | Status: SHIPPED | OUTPATIENT
Start: 2022-06-01 | End: 2022-06-09 | Stop reason: SDUPTHER

## 2022-06-01 NOTE — TELEPHONE ENCOUNTER
Last visit: 2/18/21  Next visit:   Future Appointments   Date Time Provider Sulema Alessia   6/9/2022  9:30 AM Candy Marquez MD [facility]     Last filled: 4/14/21; symbicort; qty 3 inhalers w/3 refills

## 2022-06-01 NOTE — TELEPHONE ENCOUNTER
Pt called with Rx refill request.  Requested Prescriptions     Pending Prescriptions Disp Refills    Symbicort 160-4.5 mcg/actuation HFAA [Pharmacy Med Name: Symbicort 160-4.5 MCG/ACT Inhalation Aerosol] 33 g 0     Sig: Inhale 2 puffs by mouth twice daily

## 2022-06-09 ENCOUNTER — OFFICE VISIT (OUTPATIENT)
Dept: FAMILY MEDICINE CLINIC | Age: 32
End: 2022-06-09
Payer: COMMERCIAL

## 2022-06-09 VITALS
SYSTOLIC BLOOD PRESSURE: 119 MMHG | RESPIRATION RATE: 16 BRPM | WEIGHT: 236.4 LBS | BODY MASS INDEX: 37.1 KG/M2 | DIASTOLIC BLOOD PRESSURE: 80 MMHG | TEMPERATURE: 98.1 F | HEIGHT: 67 IN | OXYGEN SATURATION: 98 % | HEART RATE: 63 BPM

## 2022-06-09 DIAGNOSIS — F41.1 GAD (GENERALIZED ANXIETY DISORDER): ICD-10-CM

## 2022-06-09 DIAGNOSIS — D75.839 THROMBOCYTOSIS: ICD-10-CM

## 2022-06-09 DIAGNOSIS — F41.0 PANIC ATTACK: ICD-10-CM

## 2022-06-09 DIAGNOSIS — E66.01 OBESITY, MORBID (HCC): ICD-10-CM

## 2022-06-09 DIAGNOSIS — Z11.59 NEED FOR HEPATITIS C SCREENING TEST: ICD-10-CM

## 2022-06-09 DIAGNOSIS — Z00.00 ANNUAL PHYSICAL EXAM: Primary | ICD-10-CM

## 2022-06-09 DIAGNOSIS — J45.40 MODERATE PERSISTENT ASTHMA WITHOUT COMPLICATION: ICD-10-CM

## 2022-06-09 DIAGNOSIS — R79.89 ELEVATED LFTS: ICD-10-CM

## 2022-06-09 PROCEDURE — 99395 PREV VISIT EST AGE 18-39: CPT | Performed by: LEGAL MEDICINE

## 2022-06-09 RX ORDER — BUDESONIDE AND FORMOTEROL FUMARATE DIHYDRATE 160; 4.5 UG/1; UG/1
2 AEROSOL RESPIRATORY (INHALATION) 2 TIMES DAILY
Qty: 3 EACH | Refills: 3 | Status: SHIPPED | OUTPATIENT
Start: 2022-06-09 | End: 2022-09-26

## 2022-06-09 NOTE — PROGRESS NOTES
Shara Child     Chief Complaint   Patient presents with    Physical     Vitals:    06/09/22 0947   BP: 119/80   Pulse: 63   Resp: 16   Temp: 98.1 °F (36.7 °C)   TempSrc: Temporal   SpO2: 98%   Weight: 236 lb 6.4 oz (107.2 kg)   Height: 5' 7\" (1.702 m)   PainSc:   0 - No pain   LMP: 05/17/2022         HPI:  Haris You is here for complete physical examination and to get labs done  Anxiety has been improving she takes medication only as needed  She is still seeing Vamsi Herrmann, NP   Does not smoke does not drink alcohol      Past Medical History:   Diagnosis Date    Anxiety     Asthma     Psychotic disorder ([facility] [address]      No past surgical history on file. Social History     Tobacco Use    Smoking status: Never Smoker    Smokeless tobacco: Never Used   Substance Use Topics    Alcohol use: Yes     Comment: occasionally       Family History   Problem Relation Age of Onset    Hypertension Mother     Anxiety Mother     Anxiety Father     Alcohol abuse Father        Review of Systems   Constitutional: Negative for chills, fever, malaise/fatigue and weight loss. HENT: Negative for congestion, ear discharge, ear pain, hearing loss, nosebleeds, sinus pain and sore throat. Eyes: Negative for blurred vision, double vision and discharge. Respiratory: Positive for wheezing. Negative for cough, hemoptysis, sputum production, shortness of breath and stridor. Cardiovascular: Negative for chest pain, palpitations, claudication and leg swelling. Gastrointestinal: Negative for abdominal pain, constipation, diarrhea, nausea and vomiting. Genitourinary: Negative for dysuria, flank pain, frequency, hematuria and urgency. Musculoskeletal: Negative for back pain, falls, joint pain, myalgias and neck pain. Skin: Negative for itching and rash. Neurological: Negative for dizziness, tingling, sensory change, speech change, focal weakness, seizures, loss of consciousness, weakness and headaches. Psychiatric/Behavioral: Negative for depression, hallucinations, substance abuse and suicidal ideas. The patient is not nervous/anxious and does not have insomnia. Physical Exam  Vitals and nursing note reviewed. Constitutional:       General: She is not in acute distress. Appearance: Normal appearance. She is well-developed. She is obese. She is not ill-appearing, toxic-appearing or diaphoretic. HENT:      Head: Normocephalic and atraumatic. Right Ear: Tympanic membrane, ear canal and external ear normal. There is no impacted cerumen. Left Ear: Tympanic membrane, ear canal and external ear normal. There is no impacted cerumen. Eyes:      General: No scleral icterus. Right eye: No discharge. Left eye: No discharge. Conjunctiva/sclera: Conjunctivae normal.      Pupils: Pupils are equal, round, and reactive to light. Neck:      Thyroid: No thyromegaly. Vascular: No carotid bruit. Cardiovascular:      Rate and Rhythm: Normal rate and regular rhythm. Heart sounds: Normal heart sounds. Pulmonary:      Effort: Pulmonary effort is normal. No respiratory distress. Breath sounds: Normal breath sounds. No stridor. No wheezing, rhonchi or rales. Chest:      Chest wall: No tenderness. Abdominal:      General: There is no distension. Palpations: Abdomen is soft. There is no mass. Tenderness: There is no abdominal tenderness. There is no right CVA tenderness, left CVA tenderness, guarding or rebound. Hernia: No hernia is present. Musculoskeletal:         General: No tenderness or deformity. Normal range of motion. Cervical back: Normal range of motion and neck supple. No rigidity or tenderness. Right lower leg: No edema. Left lower leg: No edema. Lymphadenopathy:      Cervical: No cervical adenopathy. Skin:     General: Skin is warm and dry. Coloration: Skin is not jaundiced or pale.       Findings: No bruising, erythema, lesion or rash. Neurological:      General: No focal deficit present. Mental Status: She is alert and oriented to person, place, and time. Cranial Nerves: No cranial nerve deficit. Sensory: No sensory deficit. Motor: No weakness. Coordination: Coordination normal.      Gait: Gait normal.      Deep Tendon Reflexes: Reflexes normal.   Psychiatric:         Mood and Affect: Mood normal.         Behavior: Behavior normal.         Thought Content: Thought content normal.         Judgment: Judgment normal.          Assessment and plan     Plan of care has been discussed with the patient, he agrees to the plan and verbalized understanding. All his questions were answered  More than 50% of the time spent in this visit was counseling the patient about  illness and treatment options         1. Annual physical exam    - CBC WITH AUTOMATED DIFF; Future  - METABOLIC PANEL, COMPREHENSIVE; Future  - LIPID PANEL; Future    2. ERIC (generalized anxiety disorder)    He is on Klonopin 0.5 mg twice daily and following up with psychiatry nurse donavon  3. Obesity, morbid ([address]  Lifestyle modification has been discussed with patient in details, decrease carbohydrates, decrease or eliminate sugar intake, gradually increase physical activity as tolerated to be about 4 to 5 hours a week. 4. Panic attack      5. Moderate persistent asthma without complication  Is stable she is on Symbicort regularly and on albuterol as needed  - Symbicort 160-4.5 mcg/actuation HFAA; Take 2 Puffs by inhalation two (2) times a day for 90 days. Dispense: 3 Each; Refill: 3    6. Need for hepatitis C screening test    - HEPATITIS C AB; Future    Current Outpatient Medications   Medication Sig Dispense Refill    Symbicort 160-4.5 mcg/actuation HFAA Take 2 Puffs by inhalation two (2) times a day for 90 days.  3 Each 3    albuterol (PROVENTIL HFA, VENTOLIN HFA, PROAIR HFA) 90 mcg/actuation inhaler Take 1 Puff by inhalation every six (6) hours as needed for Wheezing. 1 Inhaler 3    SPRINTEC, 28, 0.25-35 mg-mcg tab TAKE 1 TABLET BY MOUTH ONCE DAILY  5    clonazePAM (KLONOPIN) 0.5 mg tablet Take 0.5 mg by mouth two (2) times a day.  hydrOXYzine pamoate (VISTARIL) 50 mg capsule Take 50 mg by mouth three (3) times daily as needed for Anxiety.          Patient Active Problem List    Diagnosis Date Noted    Obesity, morbid ([address] 01/21/2019    ERIC (generalized anxiety disorder) 01/21/2019    Panic attack 01/21/2019    Moderate persistent asthma without complication 83/88/4187     Results for orders placed or performed in visit on 02/18/21   LIPID PANEL   Result Value Ref Range    Cholesterol, total 187 <200 mg/dL    HDL Cholesterol 42 (L) > OR = 50 mg/dL    Triglyceride 471 (H) <150 mg/dL    LDL-CHOLESTEROL  mg/dL (calc)    Cholesterol/HDL ratio 4.5 <5.0 (calc)    Non-HDL Cholesterol 145 (H) <130 mg/dL (calc)   METABOLIC PANEL, COMPREHENSIVE   Result Value Ref Range    Glucose 89 65 - 99 mg/dL    BUN 10 7 - 25 mg/dL    Creatinine 0.73 0.50 - 1.10 mg/dL    GFR est non- > OR = 60 mL/min/1.73m2    GFR est  > OR = 60 mL/min/1.73m2    BUN/Creatinine ratio NOT APPLICABLE 6 - 22 (calc)    Sodium 137 135 - 146 mmol/L    Potassium 4.1 3.5 - 5.3 mmol/L    Chloride 102 98 - 110 mmol/L    CO2 23 20 - 32 mmol/L    Calcium 9.3 8.6 - 10.2 mg/dL    Protein, total 7.2 6.1 - 8.1 g/dL    Albumin 4.2 3.6 - 5.1 g/dL    Globulin 3.0 1.9 - 3.7 g/dL (calc)    ALB/GLOBRATIO 1.4 1.0 - 2.5 (calc)    Bilirubin, total 0.3 0.2 - 1.2 mg/dL    Alkaline Phosphatase, total 71 31 - 125 U/L    AST (SGOT) 30 10 - 30 U/L    ALT (SGPT) 58 (H) 6 - 29 U/L   HEMOGLOBIN A1C W/O EAG   Result Value Ref Range    Hemoglobin A1c 5.2 <5.7 % of total Hgb   TSH 3RD GENERATION   Result Value Ref Range    TSH 2.99 mIU/L   CBC WITH AUTOMATED DIFF   Result Value Ref Range    WBC 10.3 3.8 - 10.8 Thousand/uL    RBC 4.55 3.80 - 5.10 Million/uL    HGB 12.6 11.7 - 15.5 g/dL HCT 37.2 35.0 - 45.0 %    MCV 81.8 80.0 - 100.0 fL    MCH 27.7 27.0 - 33.0 pg    MCHC 33.9 32.0 - 36.0 g/dL    RDW 13.1 11.0 - 15.0 %    PLATELET 647 (H) 793 - 400 Thousand/uL    MEAN PLATELET VOLUME 9.7 7.5 - 12.5 fL    ABS. NEUTROPHILS 5,202 1,500 - 7,800 cells/uL    ABS. LYMPHOCYTES 4,038 (H) 850 - 3,900 cells/uL    ABS. MONOCYTES 505 200 - 950 cells/uL    ABS. EOSINOPHILS 494 15 - 500 cells/uL    ABS. BASOPHILS 62 0 - 200 cells/uL    Neutrophils 50.5 %    LYMPHOCYTES 39.2 %    MONOCYTES 4.9 %    EOSINOPHILS 4.8 %    BASOPHILS 0.6 %     No visits with results within 3 Month(s) from this visit. Latest known visit with results is:   Office Visit on 02/18/2021   Component Date Value Ref Range Status    Cholesterol, total 03/04/2021 187  <200 mg/dL Final    HDL Cholesterol 03/04/2021 42* > OR = 50 mg/dL Final    Triglyceride 03/04/2021 471* <150 mg/dL Final    Comment:    If a non-fasting specimen was collected, consider  repeat triglyceride testing on a fasting specimen  if clinically indicated. Boubacar Robles et al. J. of Clin. Lipidol. 9911;6:363-478.  LDL-CHOLESTEROL 03/04/2021   mg/dL (calc) Final    Comment:    LDL cholesterol not calculated. Triglyceride levels  greater than 400 mg/dL invalidate calculated LDL results. Reference range: <100     Desirable range <100 mg/dL for primary prevention;    <70 mg/dL for patients with CHD or diabetic patients   with > or = 2 CHD risk factors. LDL-C is now calculated using the Narendra-Mendoza   calculation, which is a validated novel method providing   better accuracy than the Friedewald equation in the   estimation of LDL-C. Devang Hall et al. [facility]. 8521;922(56): 9849-6024   (http://education. [employer]/faq/YSX887)      Cholesterol/HDL ratio 03/04/2021 4.5  <5.0 (calc) Final    Non-HDL Cholesterol 03/04/2021 145* <130 mg/dL (calc) Final    Comment: For patients with diabetes plus 1 major ASCVD risk   factor, treating to a non-HDL-C goal of <100 mg/dL   (LDL-C of <70 mg/dL) is considered a therapeutic   option.  Glucose 03/04/2021 89  65 - 99 mg/dL Final    Comment:               Fasting reference interval         BUN 03/04/2021 10  7 - 25 mg/dL Final    Creatinine 03/04/2021 0.73  0.50 - 1.10 mg/dL Final    GFR est non-AA 03/04/2021 110  > OR = 60 mL/min/1.73m2 Final    GFR est AA 03/04/2021 127  > OR = 60 mL/min/1.73m2 Final    BUN/Creatinine ratio 94/29/9948 NOT APPLICABLE  6 - 22 (calc) Final    Sodium 03/04/2021 137  135 - 146 mmol/L Final    Potassium 03/04/2021 4.1  3.5 - 5.3 mmol/L Final    Chloride 03/04/2021 102  98 - 110 mmol/L Final    CO2 03/04/2021 23  20 - 32 mmol/L Final    Calcium 03/04/2021 9.3  8.6 - 10.2 mg/dL Final    Protein, total 03/04/2021 7.2  6.1 - 8.1 g/dL Final    Albumin 03/04/2021 4.2  3.6 - 5.1 g/dL Final    Globulin 03/04/2021 3.0  1.9 - 3.7 g/dL (calc) Final    ALB/GLOBRATIO 03/04/2021 1.4  1.0 - 2.5 (calc) Final    Bilirubin, total 03/04/2021 0.3  0.2 - 1.2 mg/dL Final    Alkaline Phosphatase, total 03/04/2021 71  31 - 125 U/L Final    AST (SGOT) 03/04/2021 30  10 - 30 U/L Final    ALT (SGPT) 03/04/2021 58* 6 - 29 U/L Final    Hemoglobin A1c 03/04/2021 5.2  <5.7 % of total Hgb Final    Comment: For the purpose of screening for the presence of  diabetes:     <5.7%       Consistent with the absence of diabetes  5.7-6.4%    Consistent with increased risk for diabetes              (prediabetes)  > or =6.5%  Consistent with diabetes     This assay result is consistent with a decreased risk  of diabetes. Currently, no consensus exists regarding use of  hemoglobin A1c for diagnosis of diabetes in children. According to American Diabetes Association (ADA)  guidelines, hemoglobin A1c <7.0% represents optimal  control in non-pregnant diabetic patients. Different  metrics may apply to specific patient populations. Standards of Medical Care in Diabetes(ADA).           TSH 03/04/2021 2.99  mIU/L Final    Comment:           Reference Range                         > or = 20 Years  0.40-4.50                              Pregnancy Ranges            First trimester    0.26-2.66            Second trimester   0.55-2.73            Third trimester    0.43-2.91      WBC 03/04/2021 10.3  3.8 - 10.8 Thousand/uL Final    RBC 03/04/2021 4.55  3.80 - 5.10 Million/uL Final    HGB 03/04/2021 12.6  11.7 - 15.5 g/dL Final    HCT 03/04/2021 37.2  35.0 - 45.0 % Final    MCV 03/04/2021 81.8  80.0 - 100.0 fL Final    MCH 03/04/2021 27.7  27.0 - 33.0 pg Final    MCHC 03/04/2021 33.9  32.0 - 36.0 g/dL Final    RDW 03/04/2021 13.1  11.0 - 15.0 % Final    PLATELET 43/07/3917 746* 140 - 400 Thousand/uL Final    MEAN PLATELET VOLUME 34/36/9103 9.7  7.5 - 12.5 fL Final    ABS. NEUTROPHILS 03/04/2021 5,202  1,500 - 7,800 cells/uL Final    ABS. LYMPHOCYTES 03/04/2021 4,038* 850 - 3,900 cells/uL Final    ABS. MONOCYTES 03/04/2021 505  200 - 950 cells/uL Final    ABS. EOSINOPHILS 03/04/2021 494  15 - 500 cells/uL Final    ABS. BASOPHILS 03/04/2021 62  0 - 200 cells/uL Final    Neutrophils 03/04/2021 50.5  % Final    LYMPHOCYTES 03/04/2021 39.2  % Final    MONOCYTES 03/04/2021 4.9  % Final    EOSINOPHILS 03/04/2021 4.8  % Final    BASOPHILS 03/04/2021 0.6  % Final          Follow-up and Dispositions    · Return in about 6 months (around 12/9/2022).

## 2022-06-09 NOTE — PROGRESS NOTES
Maikol Chambers is a 27 y.o. female (: 1990) presenting to address:    Chief Complaint   Patient presents with    Physical       Vitals:    22 0947   BP: 119/80   Pulse: 63   Resp: 16   Temp: 98.1 °F (36.7 °C)   TempSrc: Temporal   SpO2: 98%   Weight: 236 lb 6.4 oz (107.2 kg)   Height: 5' 7\" (1.702 m)   PainSc:   0 - No pain   LMP: 2022       Hearing/Vision:   No exam data present    Learning Assessment:     Learning Assessment 2019   PRIMARY LEARNER Patient   PRIMARY LANGUAGE ENGLISH   LEARNER PREFERENCE PRIMARY PICTURES     LISTENING     VIDEOS     DEMONSTRATION   ANSWERED BY patient   RELATIONSHIP SELF     Depression Screening:     3 most recent PHQ Screens 3/4/2021   Little interest or pleasure in doing things Not at all   Feeling down, depressed, irritable, or hopeless Not at all   Total Score PHQ 2 0     Fall Risk Assessment:     Fall Risk Assessment, last 12 mths 2021   Able to walk? Yes   Fall in past 12 months? 0   Do you feel unsteady? 0   Are you worried about falling 0     Abuse Screening:     Abuse Screening Questionnaire 10/7/2019   Do you ever feel afraid of your partner? N   Are you in a relationship with someone who physically or mentally threatens you? N   Is it safe for you to go home?  Y     ADL Assessment:     ADL Assessment 10/7/2019   Feeding yourself No Help Needed   Getting from bed to chair No Help Needed   Getting dressed No Help Needed   Bathing or showering No Help Needed   Walk across the room (includes cane/walker) No Help Needed   Using the telphone No Help Needed   Taking your medications No Help Needed   Preparing meals No Help Needed   Managing money (expenses/bills) No Help Needed   Moderately strenuous housework (laundry) No Help Needed   Shopping for personal items (toiletries/medicines) No Help Needed   Shopping for groceries No Help Needed   Driving No Help Needed   Climbing a flight of stairs No Help Needed   Getting to places beyond walking distances No Help Needed        Coordination of Care Questionaire:   1. \"Have you been to the ER, urgent care clinic since your last visit? Hospitalized since your last visit? \" Yes [facility] on October 2021 for Vertigo. 2. \"Have you seen or consulted any other health care providers outside of the [address] [place] [address] since your last visit? \" No     3. For patients aged 39-70: Has the patient had a colonoscopy / FIT/ Cologuard? NA - based on age      If the patient is female:    4. For patients aged 41-77: Has the patient had a mammogram within the past 2 years? NA - based on age or sex  See top three    5. For patients aged 21-65: Has the patient had a pap smear? No    Advanced Directive:   1. Do you have an Advanced Directive? NO    2. Would you like information on Advanced Directives?  NO

## 2022-06-14 ENCOUNTER — APPOINTMENT (OUTPATIENT)
Dept: FAMILY MEDICINE CLINIC | Age: 32
End: 2022-06-14

## 2022-06-14 ENCOUNTER — HOSPITAL ENCOUNTER (OUTPATIENT)
Dept: LAB | Age: 32
Discharge: HOME | End: 2022-06-14
Payer: COMMERCIAL

## 2022-06-14 DIAGNOSIS — Z11.59 NEED FOR HEPATITIS C SCREENING TEST: ICD-10-CM

## 2022-06-14 DIAGNOSIS — Z00.00 ANNUAL PHYSICAL EXAM: ICD-10-CM

## 2022-06-14 LAB
ALBUMIN SERPL-MCNC: 4 G/DL (ref 3.4–5)
ALBUMIN/GLOB SERPL: 1 {RATIO} (ref 0.8–1.7)
ALP SERPL-CCNC: 73 U/L (ref 45–117)
ALT SERPL-CCNC: 161 U/L (ref 13–56)
ANION GAP SERPL CALC-SCNC: 7 MMOL/L (ref 3–18)
AST SERPL-CCNC: 113 U/L (ref 10–38)
BASOPHILS # BLD: 0.1 K/UL (ref 0–0.1)
BASOPHILS NFR BLD: 1 % (ref 0–2)
BILIRUB SERPL-MCNC: 0.4 MG/DL (ref 0.2–1)
BUN SERPL-MCNC: 9 MG/DL (ref 7–18)
BUN/CREAT SERPL: 11 (ref 12–20)
CALCIUM SERPL-MCNC: 9.3 MG/DL (ref 8.5–10.1)
CHLORIDE SERPL-SCNC: 106 MMOL/L (ref 100–111)
CHOLEST SERPL-MCNC: 194 MG/DL
CO2 SERPL-SCNC: 26 MMOL/L (ref 21–32)
CREAT SERPL-MCNC: 0.8 MG/DL (ref 0.6–1.3)
DIFFERENTIAL METHOD BLD: ABNORMAL
EOSINOPHIL # BLD: 0.6 K/UL (ref 0–0.4)
EOSINOPHIL NFR BLD: 6 % (ref 0–5)
ERYTHROCYTE [DISTWIDTH] IN BLOOD BY AUTOMATED COUNT: 13.3 % (ref 11.6–14.5)
GLOBULIN SER CALC-MCNC: 4.2 G/DL (ref 2–4)
GLUCOSE SERPL-MCNC: 86 MG/DL (ref 74–99)
HCT VFR BLD AUTO: 39.5 % (ref 35–45)
HDLC SERPL-MCNC: 51 MG/DL (ref 40–60)
HDLC SERPL: 3.8 {RATIO} (ref 0–5)
HGB BLD-MCNC: 12.9 G/DL (ref 12–16)
IMM GRANULOCYTES # BLD AUTO: 0.1 K/UL (ref 0–0.04)
IMM GRANULOCYTES NFR BLD AUTO: 1 % (ref 0–0.5)
LDLC SERPL CALC-MCNC: ABNORMAL MG/DL (ref 0–100)
LIPID PROFILE,FLP: ABNORMAL
LYMPHOCYTES # BLD: 4.8 K/UL (ref 0.9–3.6)
LYMPHOCYTES NFR BLD: 48 % (ref 21–52)
MCH RBC QN AUTO: 27.1 PG (ref 24–34)
MCHC RBC AUTO-ENTMCNC: 32.7 G/DL (ref 31–37)
MCV RBC AUTO: 83 FL (ref 78–100)
MONOCYTES # BLD: 0.4 K/UL (ref 0.05–1.2)
MONOCYTES NFR BLD: 4 % (ref 3–10)
NEUTS SEG # BLD: 4 K/UL (ref 1.8–8)
NEUTS SEG NFR BLD: 41 % (ref 40–73)
NRBC # BLD: 0 K/UL (ref 0–0.01)
NRBC BLD-RTO: 0 PER 100 WBC
PLATELET # BLD AUTO: 486 K/UL (ref 135–420)
PMV BLD AUTO: 9.4 FL (ref 9.2–11.8)
POTASSIUM SERPL-SCNC: 3.8 MMOL/L (ref 3.5–5.5)
PROT SERPL-MCNC: 8.2 G/DL (ref 6.4–8.2)
RBC # BLD AUTO: 4.76 M/UL (ref 4.2–5.3)
SODIUM SERPL-SCNC: 139 MMOL/L (ref 136–145)
TRIGL SERPL-MCNC: 424 MG/DL (ref ?–150)
VLDLC SERPL CALC-MCNC: ABNORMAL MG/DL
WBC # BLD AUTO: 9.9 K/UL (ref 4.6–13.2)

## 2022-06-14 PROCEDURE — 80061 LIPID PANEL: CPT

## 2022-06-14 PROCEDURE — 86803 HEPATITIS C AB TEST: CPT

## 2022-06-14 PROCEDURE — 80053 COMPREHEN METABOLIC PANEL: CPT

## 2022-06-14 PROCEDURE — 36415 COLL VENOUS BLD VENIPUNCTURE: CPT

## 2022-06-14 PROCEDURE — 85025 COMPLETE CBC W/AUTO DIFF WBC: CPT

## 2022-06-15 LAB
HCV AB SER IA-ACNC: 0.07 INDEX
HCV AB SERPL QL IA: NEGATIVE
HCV COMMENT,HCGAC: NORMAL

## 2022-06-20 NOTE — PROGRESS NOTES
Negative hep C screening  Elevated platelets need to check for iron deficiency  Significantly elevated liver function tests patient need to abstain from alcohol intake or Tylenol intake or any other supplements that she is taking currently    Recheck blood work in 1 months

## 2022-06-20 NOTE — PROGRESS NOTES
Informed pt lab results and treatment plan. Pt is scheduled for the following to check iron panel.  Future Appointments  6/22/2022  9:40 AM    LAB_[facility]                   BSMA                BS AMB

## 2022-06-22 ENCOUNTER — HOSPITAL ENCOUNTER (OUTPATIENT)
Dept: LAB | Age: 32
Discharge: HOME | End: 2022-06-22
Payer: COMMERCIAL

## 2022-06-22 ENCOUNTER — APPOINTMENT (OUTPATIENT)
Dept: FAMILY MEDICINE CLINIC | Age: 32
End: 2022-06-22

## 2022-06-22 DIAGNOSIS — D75.839 THROMBOCYTOSIS: ICD-10-CM

## 2022-06-22 LAB
IRON SATN MFR SERPL: 20 % (ref 20–50)
IRON SERPL-MCNC: 86 UG/DL (ref 50–175)
TIBC SERPL-MCNC: 427 UG/DL (ref 250–450)

## 2022-06-22 PROCEDURE — 83540 ASSAY OF IRON: CPT

## 2022-06-22 PROCEDURE — 36415 COLL VENOUS BLD VENIPUNCTURE: CPT

## 2022-06-30 ENCOUNTER — TELEPHONE (OUTPATIENT)
Dept: FAMILY MEDICINE CLINIC | Age: 32
End: 2022-06-30

## 2022-06-30 DIAGNOSIS — R82.90 ABNORMAL URINE: Primary | ICD-10-CM

## 2022-06-30 NOTE — TELEPHONE ENCOUNTER
Patient completed an at-home urinalysis and her ketones were very elevated; pt would like to know what is the next step.

## 2022-07-01 NOTE — TELEPHONE ENCOUNTER
Patient To stay very well-hydrated, and may ask why she was checking her ketones for ??     If her levels are still high on the for her urine test

## 2022-07-01 NOTE — TELEPHONE ENCOUNTER
Spoke w/ pt to inform and she stated that she completed the at home test because she \"cut out a lot of carbohydrates in her diet\" and was just curious.

## 2022-07-06 NOTE — TELEPHONE ENCOUNTER
Pt informed. She stated that she is fine and do not need a urine test. Pt is scheduled to come in for a 1 month lab recheck on 7/11/2022. Are the labs that's in her chart for that testing?

## 2022-09-13 ENCOUNTER — TELEPHONE (OUTPATIENT)
Dept: FAMILY MEDICINE CLINIC | Age: 32
End: 2022-09-13

## 2022-09-13 NOTE — TELEPHONE ENCOUNTER
Pt called stating that she tested positive for covid and she wanted to know what the next step should be. She has sore throat, headache, body ache and fatigue.

## 2022-09-14 NOTE — TELEPHONE ENCOUNTER
Supportive care if getting worse or having breathing difficulties contact office for V V or go to Urgent care

## 2022-09-16 ENCOUNTER — VIRTUAL VISIT (OUTPATIENT)
Dept: FAMILY MEDICINE CLINIC | Age: 32
End: 2022-09-16
Payer: COMMERCIAL

## 2022-09-16 DIAGNOSIS — U07.1 COVID-19: Primary | ICD-10-CM

## 2022-09-16 PROCEDURE — 99213 OFFICE O/P EST LOW 20 MIN: CPT | Performed by: INTERNAL MEDICINE

## 2022-09-16 RX ORDER — ALBUTEROL SULFATE 90 UG/1
1 AEROSOL, METERED RESPIRATORY (INHALATION)
Qty: 1 EACH | Refills: 3 | Status: SHIPPED | OUTPATIENT
Start: 2022-09-16

## 2022-09-16 RX ORDER — PREDNISONE 10 MG/1
TABLET ORAL
Qty: 21 TABLET | Refills: 0 | Status: SHIPPED | OUTPATIENT
Start: 2022-09-16

## 2022-09-16 NOTE — PROGRESS NOTES
Keanu Davila is a 27 y.o. female who was seen by synchronous (real-time) audio-video technology on 9/16/2022 for Positive For Covid-19        Assessment & Plan:   Diagnoses and all orders for this visit:    1. COVID-19  -     predniSONE (STERAPRED DS) 10 mg dose pack; See administration instruction per 10mg dose pack  -     albuterol (PROVENTIL HFA, VENTOLIN HFA, PROAIR HFA) 90 mcg/actuation inhaler; Take 1 Puff by inhalation every six (6) hours as needed for Wheezing.  -     molnupiravir 200 mg capsule; Take 4 Capsules by mouth every twelve (12) hours. Start today  - advised pt to go to the ER if not better or if symptoms worsen. 712  Subjective:   C/o started Sunday night with fever/body aches/headache and dry cough, she had a positive home covid test 3 days ago, went to  and had another covid test that was positive, she has been taking advil and Mucinex DM otc as needed, she has been doing ok until this afternoon when she had wheezing, she used her Albuterol inhaler and felt better, no wheezing or SOB now, her cough is controlled with Mucinex DM. Pt has Asthma, use symbicort daily      Prior to Admission medications    Medication Sig Start Date End Date Taking? Authorizing Provider   predniSONE (STERAPRED DS) 10 mg dose pack See administration instruction per 10mg dose pack 9/16/22  Yes Jailene KUO MD   albuterol (PROVENTIL HFA, VENTOLIN HFA, PROAIR HFA) 90 mcg/actuation inhaler Take 1 Puff by inhalation every six (6) hours as needed for Wheezing. 9/16/22  Yes Reta Mclain MD   molnupiravir 200 mg capsule Take 4 Capsules by mouth every twelve (12) hours. 9/16/22  Yes Momo Correia MD   SPRINTEC, 28, 0.25-35 mg-mcg tab TAKE 1 TABLET BY MOUTH ONCE DAILY 9/26/19  Yes Provider, Historical   clonazePAM (KLONOPIN) 0.5 mg tablet Take 0.5 mg by mouth two (2) times a day.    Yes Provider, Historical   hydrOXYzine pamoate (VISTARIL) 50 mg capsule Take 50 mg by mouth three (3) times daily as needed for Anxiety. Yes Provider, Historical   Symbicort 160-4.5 mcg/actuation HFAA Take 2 Puffs by inhalation two (2) times a day for 90 days. 6/9/22 9/7/22  Sanford Mayen MD     Patient Active Problem List    Diagnosis Date Noted    Obesity, morbid ([facility] [address] 01/21/2019    ERIC (generalized anxiety disorder) 01/21/2019    Panic attack 01/21/2019    Moderate persistent asthma without complication 06/64/6341     Past Medical History:   Diagnosis Date    Anxiety     Asthma     Psychotic disorder ([facility] [address]        Review of Systems   Constitutional:  Negative for chills and fever. HENT:  Negative for ear pain. Respiratory:  Negative for sputum production. Cardiovascular:  Negative for chest pain. Gastrointestinal:  Negative for abdominal pain. Objective:     Patient-Reported Vitals 9/16/2022   Patient-Reported Pulse -   Patient-Reported LMP 8-21-22. General: alert, cooperative, no distress   Mental  status: normal mood, behavior, speech, dress, motor activity, and thought processes, able to follow commands   HENT: NCAT   Neck: no visualized mass   Resp: no respiratory distress   Neuro: no gross deficits   Skin: no discoloration or lesions of concern on visible areas   Psychiatric: normal affect, consistent with stated mood, no evidence of hallucinations     Additional exam findings: We discussed the expected course, resolution and complications of the diagnosis(es) in detail. Medication risks, benefits, costs, interactions, and alternatives were discussed as indicated. I advised her to contact the office if her condition worsens, changes or fails to improve as anticipated. She expressed understanding with the diagnosis(es) and plan. Yaya David, was evaluated through a synchronous (real-time) audio-video encounter. The patient (or guardian if applicable) is aware that this is a billable service, which includes applicable co-pays.  This Virtual Visit was conducted with patient's (and/or legal guardian's) consent. The visit was conducted pursuant to the emergency declaration under the [facility] [address] authority and the [employer] and [employer] Act. Patient identification was verified, and a caregiver was present when appropriate. The patient was located at: Home: [address] [place] [address]  The provider was located at:  Facility ([facility] Department): [facility] 5068          Jason Mckeon MD

## 2022-09-26 ENCOUNTER — VIRTUAL VISIT (OUTPATIENT)
Dept: FAMILY MEDICINE CLINIC | Age: 32
End: 2022-09-26
Payer: COMMERCIAL

## 2022-09-26 ENCOUNTER — TELEPHONE (OUTPATIENT)
Dept: FAMILY MEDICINE CLINIC | Age: 32
End: 2022-09-26

## 2022-09-26 DIAGNOSIS — R42 DIZZINESS: Primary | ICD-10-CM

## 2022-09-26 DIAGNOSIS — U09.9 POST COVID-19 CONDITION, UNSPECIFIED: ICD-10-CM

## 2022-09-26 DIAGNOSIS — R07.9 CHEST PAIN, UNSPECIFIED TYPE: ICD-10-CM

## 2022-09-26 DIAGNOSIS — R06.02 SOB (SHORTNESS OF BREATH) ON EXERTION: ICD-10-CM

## 2022-09-26 PROCEDURE — 99214 OFFICE O/P EST MOD 30 MIN: CPT | Performed by: LEGAL MEDICINE

## 2022-09-26 RX ORDER — MECLIZINE HCL 12.5 MG 12.5 MG/1
12.5 TABLET ORAL
Qty: 30 TABLET | Refills: 0 | Status: SHIPPED | OUTPATIENT
Start: 2022-09-26 | End: 2022-10-06

## 2022-09-26 NOTE — PROGRESS NOTES
Laura Monroe is a 27 y.o. female (: 1990) presenting to address:    Chief Complaint   Patient presents with    Chest Pain       There were no vitals filed for this visit. Hearing/Vision:   No results found. Learning Assessment:     Learning Assessment 2019   PRIMARY LEARNER Patient   PRIMARY LANGUAGE ENGLISH   LEARNER PREFERENCE PRIMARY PICTURES     LISTENING     VIDEOS     DEMONSTRATION   ANSWERED BY patient   RELATIONSHIP SELF     Depression Screening:     3 most recent PHQ Screens 2022   Little interest or pleasure in doing things More than half the days   Feeling down, depressed, irritable, or hopeless More than half the days   Total Score PHQ 2 4   Trouble falling or staying asleep, or sleeping too much Nearly every day   Feeling tired or having little energy Nearly every day   Poor appetite, weight loss, or overeating Several days   Feeling bad about yourself - or that you are a failure or have let yourself or your family down Several days   Trouble concentrating on things such as school, work, reading, or watching TV More than half the days   Moving or speaking so slowly that other people could have noticed; or the opposite being so fidgety that others notice Not at all   Thoughts of being better off dead, or hurting yourself in some way Several days   PHQ 9 Score 15   How difficult have these problems made it for you to do your work, take care of your home and get along with others Very difficult     Fall Risk Assessment:     Fall Risk Assessment, last 12 mths 2021   Able to walk? Yes   Fall in past 12 months? 0   Do you feel unsteady? 0   Are you worried about falling 0     Abuse Screening:     Abuse Screening Questionnaire 10/7/2019   Do you ever feel afraid of your partner? N   Are you in a relationship with someone who physically or mentally threatens you? N   Is it safe for you to go home?  Y     ADL Assessment:     ADL Assessment 10/7/2019   Feeding yourself No Help Needed Getting from bed to chair No Help Needed   Getting dressed No Help Needed   Bathing or showering No Help Needed   Walk across the room (includes cane/walker) No Help Needed   Using the telphone No Help Needed   Taking your medications No Help Needed   Preparing meals No Help Needed   Managing money (expenses/bills) No Help Needed   Moderately strenuous housework (laundry) No Help Needed   Shopping for personal items (toiletries/medicines) No Help Needed   Shopping for groceries No Help Needed   Driving No Help Needed   Climbing a flight of stairs No Help Needed   Getting to places beyond walking distances No Help Needed        Coordination of Care Questionaire:   1. \"Have you been to the ER, urgent care clinic since your last visit? Hospitalized since your last visit? \"  [facility] ER    2. \"Have you seen or consulted any other health care providers outside of the [address] [place] [address] since your last visit? \" No     3. For patients aged 39-70: Has the patient had a colonoscopy / FIT/ Cologuard? NA - based on age    If the patient is female:    4. For patients aged 41-77: Has the patient had a mammogram within the past 2 years? NA - based on age or sex  See top three    5. For patients aged 21-65: Has the patient had a pap smear? Yes - no Care Gap present    Advanced Directive:   1. Do you have an Advanced Directive? NO    2. Would you like information on Advanced Directives?  NO    [phone]

## 2022-09-26 NOTE — PROGRESS NOTES
Farzana Gaona is a 27 y.o. female who was seen by synchronous (real-time) audio-video technology on 9/26/2022 for Chest Pain    Tested positive for covid 19 on 9/13 /22 she had been sick for few days prior she took antiviral  medication molnupiravir     Now has dizziness ,lightheadedness , it is triggered by changing positions. 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 Dizziness  Patient does stay well-hydrated  Also she moves slowly when she changed positions    -     meclizine (ANTIVERT) 12.5 mg tablet; Take 1 Tablet by mouth three (3) times daily as needed for Dizziness for up to 10 days. 2. Post covid-19 condition, unspecified  -     meclizine (ANTIVERT) 12.5 mg tablet; Take 1 Tablet by mouth three (3) times daily as needed for Dizziness for up to 10 days.  -     ECHO ADULT COMPLETE; Future    3. SOB (shortness of breath) on exertion  To get an echocardiogram  -     ECHO ADULT COMPLETE; Future    4. Chest pain, unspecified type  -     ECHO ADULT COMPLETE; Future          Subjective:       Prior to Admission medications    Medication Sig Start Date End Date Taking? Authorizing Provider   meclizine (ANTIVERT) 12.5 mg tablet Take 1 Tablet by mouth three (3) times daily as needed for Dizziness for up to 10 days.  9/26/22 10/6/22 Yes Jose Antonio Hernandez MD   albuterol (PROVENTIL HFA, VENTOLIN HFA, PROAIR HFA) 90 mcg/actuation inhaler Take 1 Puff by inhalation every six (6) hours as needed for Wheezing. 9/16/22  Yes Enedelia Whittington MD   Symbicort 160-4.5 mcg/actuation HFAA Take 2 Puffs by inhalation two (2) times a day for 90 days. 6/9/22 9/26/22 Yes Cody Cameron MD   [facility], 28, 0.25-35 mg-mcg tab TAKE 1 TABLET BY MOUTH ONCE DAILY 9/26/19  Yes Provider, Historical   clonazePAM (KLONOPIN) 0.5 mg tablet Take 0.5 mg by mouth two (2) times a day. Yes Provider, Historical   predniSONE (STERAPRED DS) 10 mg dose pack See administration instruction per 10mg dose pack  Patient not taking: Reported on 9/26/2022 9/16/22   Enedelia Whittington MD   molnupiravir 200 mg capsule Take 4 Capsules by mouth every twelve (12) hours. Patient not taking: Reported on 9/26/2022 9/16/22   Enedelia Whittington MD   hydrOXYzine pamoate (VISTARIL) 50 mg capsule Take 50 mg by mouth three (3) times daily as needed for Anxiety. Patient not taking: Reported on 9/26/2022 9/26/22  Provider, Historical     Patient Active Problem List   Diagnosis Code    Obesity, morbid ([facility] [address] E66.01    ERIC (generalized anxiety disorder) F41.1    Panic attack F41.0    Moderate persistent asthma without complication M65.98     Patient Active Problem List    Diagnosis Date Noted    Obesity, morbid ([facility] [address] 01/21/2019    ERIC (generalized anxiety disorder) 01/21/2019    Panic attack 01/21/2019    Moderate persistent asthma without complication 55/61/4132     Current Outpatient Medications   Medication Sig Dispense Refill    meclizine (ANTIVERT) 12.5 mg tablet Take 1 Tablet by mouth three (3) times daily as needed for Dizziness for up to 10 days. 30 Tablet 0    albuterol (PROVENTIL HFA, VENTOLIN HFA, PROAIR HFA) 90 mcg/actuation inhaler Take 1 Puff by inhalation every six (6) hours as needed for Wheezing. 1 Each 3    Symbicort 160-4.5 mcg/actuation HFAA Take 2 Puffs by inhalation two (2) times a day for 90 days.  3 Each 3    SPRINTEC, 28, 0.25-35 mg-mcg tab TAKE 1 TABLET BY MOUTH ONCE DAILY  5    clonazePAM (KLONOPIN) 0.5 mg tablet Take 0.5 mg by mouth two (2) times a day. predniSONE (STERAPRED DS) 10 mg dose pack See administration instruction per 10mg dose pack (Patient not taking: Reported on 9/26/2022) 21 Tablet 0    molnupiravir 200 mg capsule Take 4 Capsules by mouth every twelve (12) hours. (Patient not taking: Reported on 9/26/2022) 40 Capsule 0     Allergies   Allergen Reactions    Codeine Nausea and Vomiting     Past Medical History:   Diagnosis Date    Anxiety     Asthma     Psychotic disorder ([facility] [address]      No past surgical history on file. Family History   Problem Relation Age of Onset    Hypertension Mother     Anxiety Mother     Anxiety Father     Alcohol abuse Father      Social History     Tobacco Use    Smoking status: Never    Smokeless tobacco: Never   Substance Use Topics    Alcohol use: Yes     Comment: occasionally       Review of Systems   Constitutional:  Negative for chills, fever, malaise/fatigue and weight loss. HENT:  Negative for congestion, ear discharge, ear pain, hearing loss, nosebleeds, sinus pain and sore throat. Eyes:  Negative for blurred vision, double vision and discharge. Respiratory:  Negative for cough, hemoptysis, sputum production, shortness of breath, wheezing and stridor. Cardiovascular:  Negative for chest pain, palpitations, claudication and leg swelling. Gastrointestinal:  Negative for abdominal pain, constipation, diarrhea, nausea and vomiting. Genitourinary:  Negative for dysuria, frequency and urgency. Musculoskeletal:  Negative for myalgias. Skin:  Negative for itching and rash. Neurological:  Positive for dizziness and headaches. Negative for tingling, sensory change, speech change, focal weakness and weakness. Psychiatric/Behavioral:  Negative for depression and suicidal ideas.       Objective:     Patient-Reported Vitals 9/16/2022   Patient-Reported Pulse - Patient-Reported LMP 8-21-22. General: alert, cooperative, no distress   Mental  status: normal mood, behavior, speech, dress, motor activity, and thought processes, able to follow commands   HENT: NCAT   Neck: no visualized mass   Resp: no respiratory distress   Neuro: no gross deficits   Skin: no discoloration or lesions of concern on visible areas   Psychiatric: normal affect, consistent with stated mood, no evidence of hallucinations     Additional exam findings: We discussed the expected course, resolution and complications of the diagnosis(es) in detail. Medication risks, benefits, costs, interactions, and alternatives were discussed as indicated. I advised her to contact the office if her condition worsens, changes or fails to improve as anticipated. She expressed understanding with the diagnosis(es) and plan. Gagandeep Lopez, was evaluated through a synchronous (real-time) audio-video encounter. The patient (or guardian if applicable) is aware that this is a billable service, which includes applicable co-pays. This Virtual Visit was conducted with patient's (and/or legal guardian's) consent. The visit was conducted pursuant to the emergency declaration under the [facility], [facility] authority and the [place] and [employer] General Act. Patient identification was verified, and a caregiver was present when appropriate. The patient was located at: Home: [address]  The provider was located at:  Facility (Appt Department): [facility]        Shara Cruz MD

## 2022-09-26 NOTE — LETTER
NOTIFICATION RETURN TO WORK / SCHOOL    9/27/2022 7:47 AM    Ms. Gagandeep Lopez  [address] [address]      To Whom It May Concern:    Gagandeep Lopez is currently under the care of [address] [place] [address]. She will return to work on: Monday, October 3, 2022. If there are questions or concerns please have the patient contact our office.         Sincerely,      Shara Cruz MD

## 2023-08-23 RX ORDER — BUDESONIDE AND FORMOTEROL FUMARATE DIHYDRATE 160; 4.5 UG/1; UG/1
2 AEROSOL RESPIRATORY (INHALATION) 2 TIMES DAILY
Qty: 10.2 G | Refills: 1 | OUTPATIENT
Start: 2023-08-23

## 2023-08-23 NOTE — TELEPHONE ENCOUNTER
----- Message from Joe Mccauley sent at 8/22/2023  4:30 PM EDT -----  Subject: Refill Request    QUESTIONS  Name of Medication? budesonide-formoterol (SYMBICORT) 160-4.5 MCG/ACT AERO  Patient-reported dosage and instructions? 160-4.5 MG every day  How many days do you have left? 0  Preferred Pharmacy? [id number]  Pharmacy phone number (if available)? [phone]  ---------------------------------------------------------------------------  --------------  CALL BACK INFO  What is the best way for the office to contact you? OK to leave message on   voicemail  Preferred Call Back Phone Number? [phone]  ---------------------------------------------------------------------------  --------------  SCRIPT ANSWERS  Relationship to Patient?  Self

## 2023-08-25 ENCOUNTER — TELEPHONE (OUTPATIENT)
Dept: FAMILY MEDICINE CLINIC | Facility: CLINIC | Age: 33
End: 2023-08-25

## 2023-08-25 NOTE — TELEPHONE ENCOUNTER
Spoke w/ patient to inform that Dr. Ludmila May is no longer a provider for [facility], however refill request was sent to covering provider who works certain days and have more messages than normal since PCP departure. Advised pt to go to an urgent care or ER if she is having difficulty breathing and they will more than likely refill her inhaler.

## 2023-08-28 RX ORDER — BUDESONIDE AND FORMOTEROL FUMARATE DIHYDRATE 160; 4.5 UG/1; UG/1
2 AEROSOL RESPIRATORY (INHALATION) 2 TIMES DAILY
Qty: 3 EACH | Refills: 0 | Status: SHIPPED | OUTPATIENT
Start: 2023-08-28

## 2023-08-28 NOTE — TELEPHONE ENCOUNTER
This was approved on 8/23/23, but it looks like no pharmacy was attached, and I must have not seen that. Looks like she used to use the [facility] [address] Vaughan Lights submit there.

## 2024-07-05 ENCOUNTER — TELEPHONE (OUTPATIENT)
Dept: FAMILY MEDICINE CLINIC | Facility: CLINIC | Age: 34
End: 2024-07-05

## 2024-07-05 NOTE — TELEPHONE ENCOUNTER
LVM requesting call back to update insurance information prior to upcoming appointment. PLEASE UPDATE INSURANCE UPON RETURN CALL FROM  PATIENT.

## 2024-07-11 ENCOUNTER — OFFICE VISIT (OUTPATIENT)
Dept: FAMILY MEDICINE CLINIC | Facility: CLINIC | Age: 34
End: 2024-07-11
Payer: COMMERCIAL

## 2024-07-11 VITALS
TEMPERATURE: 98.7 F | WEIGHT: 201 LBS | HEART RATE: 89 BPM | HEIGHT: 66 IN | RESPIRATION RATE: 14 BRPM | DIASTOLIC BLOOD PRESSURE: 78 MMHG | BODY MASS INDEX: 32.3 KG/M2 | SYSTOLIC BLOOD PRESSURE: 131 MMHG | OXYGEN SATURATION: 96 %

## 2024-07-11 DIAGNOSIS — Z76.89 ESTABLISHING CARE WITH NEW DOCTOR, ENCOUNTER FOR: Primary | ICD-10-CM

## 2024-07-11 DIAGNOSIS — E78.1 HYPERTRIGLYCERIDEMIA: ICD-10-CM

## 2024-07-11 DIAGNOSIS — R06.02 SHORTNESS OF BREATH: ICD-10-CM

## 2024-07-11 DIAGNOSIS — Z11.3 ROUTINE SCREENING FOR STI (SEXUALLY TRANSMITTED INFECTION): ICD-10-CM

## 2024-07-11 PROCEDURE — 99214 OFFICE O/P EST MOD 30 MIN: CPT | Performed by: STUDENT IN AN ORGANIZED HEALTH CARE EDUCATION/TRAINING PROGRAM

## 2024-07-11 PROCEDURE — 90651 9VHPV VACCINE 2/3 DOSE IM: CPT | Performed by: STUDENT IN AN ORGANIZED HEALTH CARE EDUCATION/TRAINING PROGRAM

## 2024-07-11 PROCEDURE — 90471 IMMUNIZATION ADMIN: CPT | Performed by: STUDENT IN AN ORGANIZED HEALTH CARE EDUCATION/TRAINING PROGRAM

## 2024-07-11 RX ORDER — BUDESONIDE AND FORMOTEROL FUMARATE DIHYDRATE 160; 4.5 UG/1; UG/1
2 AEROSOL RESPIRATORY (INHALATION) 2 TIMES DAILY
Qty: 3 EACH | Refills: 0 | Status: SHIPPED | OUTPATIENT
Start: 2024-07-11

## 2024-07-11 SDOH — ECONOMIC STABILITY: FOOD INSECURITY: WITHIN THE PAST 12 MONTHS, THE FOOD YOU BOUGHT JUST DIDN'T LAST AND YOU DIDN'T HAVE MONEY TO GET MORE.: NEVER TRUE

## 2024-07-11 SDOH — ECONOMIC STABILITY: HOUSING INSECURITY
IN THE LAST 12 MONTHS, WAS THERE A TIME WHEN YOU DID NOT HAVE A STEADY PLACE TO SLEEP OR SLEPT IN A SHELTER (INCLUDING NOW)?: NO

## 2024-07-11 SDOH — ECONOMIC STABILITY: FOOD INSECURITY: WITHIN THE PAST 12 MONTHS, YOU WORRIED THAT YOUR FOOD WOULD RUN OUT BEFORE YOU GOT MONEY TO BUY MORE.: NEVER TRUE

## 2024-07-11 SDOH — HEALTH STABILITY: PHYSICAL HEALTH: ON AVERAGE, HOW MANY MINUTES DO YOU ENGAGE IN EXERCISE AT THIS LEVEL?: 120 MIN

## 2024-07-11 SDOH — ECONOMIC STABILITY: INCOME INSECURITY: HOW HARD IS IT FOR YOU TO PAY FOR THE VERY BASICS LIKE FOOD, HOUSING, MEDICAL CARE, AND HEATING?: NOT HARD AT ALL

## 2024-07-11 SDOH — HEALTH STABILITY: PHYSICAL HEALTH: ON AVERAGE, HOW MANY DAYS PER WEEK DO YOU ENGAGE IN MODERATE TO STRENUOUS EXERCISE (LIKE A BRISK WALK)?: 5 DAYS

## 2024-07-11 ASSESSMENT — PATIENT HEALTH QUESTIONNAIRE - PHQ9
SUM OF ALL RESPONSES TO PHQ QUESTIONS 1-9: 2
DEPRESSION UNABLE TO ASSESS: FUNCTIONAL CAPACITY MOTIVATION LIMITS ACCURACY
2. FEELING DOWN, DEPRESSED OR HOPELESS: MORE THAN HALF THE DAYS
SUM OF ALL RESPONSES TO PHQ QUESTIONS 1-9: 2
SUM OF ALL RESPONSES TO PHQ QUESTIONS 1-9: 2
1. LITTLE INTEREST OR PLEASURE IN DOING THINGS: NOT AT ALL
SUM OF ALL RESPONSES TO PHQ QUESTIONS 1-9: 2
SUM OF ALL RESPONSES TO PHQ9 QUESTIONS 1 & 2: 2

## 2024-07-11 ASSESSMENT — ENCOUNTER SYMPTOMS
SHORTNESS OF BREATH: 0
CHEST TIGHTNESS: 0
SINUS PAIN: 0

## 2024-07-11 NOTE — PROGRESS NOTES
Marianela Mackenzie is a 33 y.o. female (: 1990) presenting to address:    Chief Complaint   Patient presents with    New Patient     Moles to check  Medication refill on Symbicort        Vitals:    24 1308   BP: 131/78   Pulse: 89   Resp: 14   Temp: 98.7 °F (37.1 °C)   SpO2: 96%       \"Have you been to the ER, urgent care clinic since your last visit?  Hospitalized since your last visit?\"    Yes, Urgent Care for 2 months ago URI    “Have you seen or consulted any other health care providers outside of [facility] since your last visit?”    Yes, [facility]        “Have you had a pap smear?”    NO, before COVID    No cervical cancer screening on file         
Comments    Smoked for 10 yrs, switched to vape, quit during pandemic       Social History     Substance and Sexual Activity   Alcohol Use Yes    Comment: Drink socially once every couple of months       Immunization History   Administered Date(s) Administered    COVID-19, MODERNA BLUE border, Primary or Immunocompromised, (age 12y+), IM, 100 mcg/0.5mL 03/24/2021, 04/21/2021, 11/17/2021    HPV, GARDASIL 9, (age 9y-45y), IM, 0.5mL 07/11/2024    Pneumococcal, PPSV23, PNEUMOVAX 23, (age 2y+), SC/IM, 0.5mL 03/04/2021       Patient Active Problem List   Diagnosis    Panic attack    NÉSTOR (generalized anxiety disorder)    Moderate persistent asthma without complication    Obesity, morbid (HCC)         Current Outpatient Medications:     budesonide-formoterol (SYMBICORT) 160-4.5 MCG/ACT AERO, Inhale 2 puffs into the lungs 2 times daily, Disp: 3 each, Rfl: 0    albuterol sulfate HFA (PROVENTIL;VENTOLIN;PROAIR) 108 (90 Base) MCG/ACT inhaler, Inhale 1 puff into the lungs every 6 hours as needed, Disp: , Rfl:     clonazePAM (KLONOPIN) 0.5 MG tablet, Take 1 tablet by mouth 2 times daily., Disp: , Rfl:     norgestimate-ethinyl estradiol (SPRINTEC 28) 0.25-35 MG-MCG per tablet, TAKE 1 TABLET BY MOUTH ONCE DAILY, Disp: , Rfl:       Review of Systems   Constitutional:  Negative for activity change.   HENT:  Negative for congestion and sinus pain.    Respiratory:  Negative for chest tightness and shortness of breath.    Cardiovascular:  Negative for chest pain.   Neurological:  Negative for dizziness and seizures.   Psychiatric/Behavioral:  Negative for agitation and behavioral problems.        Physical Exam  Constitutional:       Appearance: Normal appearance.   HENT:      Head: Normocephalic and atraumatic.   Eyes:      Extraocular Movements: Extraocular movements intact.      Pupils: Pupils are equal, round, and reactive to light.   Cardiovascular:      Rate and Rhythm: Normal rate and regular rhythm.      Heart sounds: Normal

## 2024-10-11 ENCOUNTER — HOSPITAL ENCOUNTER (OUTPATIENT)
Facility: HOSPITAL | Age: 34
Setting detail: SPECIMEN
Discharge: HOME | End: 2024-10-14
Payer: COMMERCIAL

## 2024-10-11 ENCOUNTER — OFFICE VISIT (OUTPATIENT)
Dept: FAMILY MEDICINE CLINIC | Facility: CLINIC | Age: 34
End: 2024-10-11

## 2024-10-11 VITALS
HEART RATE: 75 BPM | OXYGEN SATURATION: 100 % | RESPIRATION RATE: 18 BRPM | WEIGHT: 195.6 LBS | TEMPERATURE: 98.2 F | BODY MASS INDEX: 32.59 KG/M2 | HEIGHT: 65 IN | DIASTOLIC BLOOD PRESSURE: 85 MMHG | SYSTOLIC BLOOD PRESSURE: 134 MMHG

## 2024-10-11 DIAGNOSIS — D22.9 NEVUS: ICD-10-CM

## 2024-10-11 DIAGNOSIS — Z12.4 PAPANICOLAOU SMEAR: Primary | ICD-10-CM

## 2024-10-11 DIAGNOSIS — Z30.09 CONTRACEPTIVE EDUCATION: ICD-10-CM

## 2024-10-11 PROCEDURE — 87491 CHLMYD TRACH DNA AMP PROBE: CPT

## 2024-10-11 PROCEDURE — 87661 TRICHOMONAS VAGINALIS AMPLIF: CPT

## 2024-10-11 PROCEDURE — 87624 HPV HI-RISK TYP POOLED RSLT: CPT

## 2024-10-11 PROCEDURE — 87591 N.GONORRHOEAE DNA AMP PROB: CPT

## 2024-10-11 PROCEDURE — 88175 CYTOPATH C/V AUTO FLUID REDO: CPT

## 2024-10-11 ASSESSMENT — ENCOUNTER SYMPTOMS
SHORTNESS OF BREATH: 0
CHEST TIGHTNESS: 0
SINUS PAIN: 0

## 2024-10-11 NOTE — PROGRESS NOTES
[facility]    HISTORY OF PRESENT ILLNESS  Marianela Mackenzie  is a 33 y.o. y.o. female here for pap smear.    Panic attacks  -takes klonopin 0.5mg BID  -following Dr. Plunkett psychiatry    Lesion on R knee  -present x 2 years  -initially presented like a pimple, but then never went away  -discolored from red to brown within lesion  -biopsy was attempted today, but pt unable to tolerate procedure due to panic attack    Health Maintenance:    Cervical Cancer Screen/PAP: possibly over 5 years ago, repeat today  Breast Cancer Screen/Mammogram: n/a  HCV Screen:   Lab Results   Component Value Date    HEPCAB 0.07 06/14/2022    HEPCAB Negative 06/14/2022      DEXA:   No results found for this or any previous visit from the past 3650 days.     Colon Cancer Screening: n/a    Smoking Status:   Tobacco Use      Smoking status: Former        Packs/day: 2.00        Years: 2.0 packs/day for 10.0 years (20.0 ttl pk-yrs)        Types: Cigarettes, E-Cigarettes      Smokeless tobacco: Never      Tobacco comments: Smoked for 10 yrs, switched to vape, quit during pandemic     Lung Cancer Screening:  CT Low Dose n/a       Sexually Active: Yes  Using Contraception: Yes OCP, interested in switching to nexplanon  Taking Prenatals: No    Immunizations:  Flu vaccine- Recommended every fall  COVID vaccine primary series- complete  Tetanus- Tdap   Shingrix- series not completed  RSV-not recommended  Pneumovax 23-  N/A  Prevnar 20- at age 65     Social: manager at [employer]     Mr#: [id number]      Past Medical History:   Diagnosis Date    ADHD (attention deficit hyperactivity disorder) 1998    Anxiety     Asthma     Depression     Psychotic disorder (HCC)        History reviewed. No pertinent surgical history.    Family History   Problem Relation Age of Onset    Hypertension Mother     Anxiety Disorder Mother     Anxiety Disorder Father     Alcohol Abuse Father        Allergies   Allergen Reactions    Codeine Nausea And Vomiting

## 2024-10-11 NOTE — PROGRESS NOTES
Marianela Mackenzie is a 33 y.o. female (: 1990) presenting to address:    Chief Complaint   Patient presents with    Gynecologic Exam       Vitals:    10/11/24 1121   BP: 134/85   Pulse: 75   Resp: 18   Temp: 98.2 °F (36.8 °C)   SpO2: 100%       \"Have you been to the ER, urgent care clinic since your last visit?  Hospitalized since your last visit?\"    NO    “Have you seen or consulted any other health care providers outside of [facility] since your last visit?”    NO

## 2024-10-14 LAB
C TRACH RRNA SPEC QL NAA+PROBE: NEGATIVE
HPV I/H RISK 1 DNA CVX QL PROBE+SIG AMP: NEGATIVE
N GONORRHOEA RRNA SPEC QL NAA+PROBE: NEGATIVE
SPECIMEN SOURCE: NORMAL
T VAGINALIS RRNA SPEC QL NAA+PROBE: NEGATIVE

## 2025-03-04 SDOH — ECONOMIC STABILITY: INCOME INSECURITY: IN THE LAST 12 MONTHS, WAS THERE A TIME WHEN YOU WERE NOT ABLE TO PAY THE MORTGAGE OR RENT ON TIME?: NO

## 2025-03-04 SDOH — ECONOMIC STABILITY: FOOD INSECURITY: WITHIN THE PAST 12 MONTHS, THE FOOD YOU BOUGHT JUST DIDN'T LAST AND YOU DIDN'T HAVE MONEY TO GET MORE.: NEVER TRUE

## 2025-03-04 SDOH — ECONOMIC STABILITY: FOOD INSECURITY: WITHIN THE PAST 12 MONTHS, YOU WORRIED THAT YOUR FOOD WOULD RUN OUT BEFORE YOU GOT MONEY TO BUY MORE.: NEVER TRUE

## 2025-03-04 SDOH — ECONOMIC STABILITY: TRANSPORTATION INSECURITY
IN THE PAST 12 MONTHS, HAS LACK OF TRANSPORTATION KEPT YOU FROM MEETINGS, WORK, OR FROM GETTING THINGS NEEDED FOR DAILY LIVING?: NO

## 2025-03-04 SDOH — ECONOMIC STABILITY: TRANSPORTATION INSECURITY
IN THE PAST 12 MONTHS, HAS THE LACK OF TRANSPORTATION KEPT YOU FROM MEDICAL APPOINTMENTS OR FROM GETTING MEDICATIONS?: NO

## 2025-03-06 ENCOUNTER — HOSPITAL ENCOUNTER (OUTPATIENT)
Facility: HOSPITAL | Age: 35
Setting detail: SPECIMEN
Discharge: HOME | End: 2025-03-09
Payer: COMMERCIAL

## 2025-03-06 ENCOUNTER — OFFICE VISIT (OUTPATIENT)
Dept: FAMILY MEDICINE CLINIC | Facility: CLINIC | Age: 35
End: 2025-03-06

## 2025-03-06 VITALS
DIASTOLIC BLOOD PRESSURE: 81 MMHG | TEMPERATURE: 98.3 F | SYSTOLIC BLOOD PRESSURE: 124 MMHG | HEIGHT: 65 IN | BODY MASS INDEX: 33.66 KG/M2 | RESPIRATION RATE: 16 BRPM | OXYGEN SATURATION: 98 % | HEART RATE: 81 BPM | WEIGHT: 202 LBS

## 2025-03-06 DIAGNOSIS — D22.9 NEVUS: Primary | ICD-10-CM

## 2025-03-06 PROCEDURE — 88305 TISSUE EXAM BY PATHOLOGIST: CPT

## 2025-03-06 SDOH — ECONOMIC STABILITY: FOOD INSECURITY: WITHIN THE PAST 12 MONTHS, YOU WORRIED THAT YOUR FOOD WOULD RUN OUT BEFORE YOU GOT MONEY TO BUY MORE.: NEVER TRUE

## 2025-03-06 SDOH — ECONOMIC STABILITY: FOOD INSECURITY: WITHIN THE PAST 12 MONTHS, THE FOOD YOU BOUGHT JUST DIDN'T LAST AND YOU DIDN'T HAVE MONEY TO GET MORE.: NEVER TRUE

## 2025-03-06 ASSESSMENT — PATIENT HEALTH QUESTIONNAIRE - PHQ9
SUM OF ALL RESPONSES TO PHQ QUESTIONS 1-9: 0
1. LITTLE INTEREST OR PLEASURE IN DOING THINGS: NOT AT ALL
SUM OF ALL RESPONSES TO PHQ QUESTIONS 1-9: 0
SUM OF ALL RESPONSES TO PHQ QUESTIONS 1-9: 0
2. FEELING DOWN, DEPRESSED OR HOPELESS: NOT AT ALL
SUM OF ALL RESPONSES TO PHQ QUESTIONS 1-9: 0

## 2025-03-06 ASSESSMENT — ENCOUNTER SYMPTOMS
SINUS PAIN: 0
CHEST TIGHTNESS: 0
SHORTNESS OF BREATH: 0

## 2025-03-06 NOTE — PROGRESS NOTES
[facility]    HISTORY OF PRESENT ILLNESS  Marianela Mackenzie  is a 34 y.o. y.o. female here for FU.      mole on right knee  - Brown and red  - No clear borders  - Raised  - No drainage or itchiness present  -No prior history of skin cancer  -Attempted removal at last visit but patient unable to tolerate secondary to panic attack  -Feels much better today and willing to try to remove again    Skin Biopsy Procedure Note    PRE-OP DIAGNOSIS: nevus  POST-OP DIAGNOSIS: Same   PROCEDURE: skin biopsy  Performing Physician: Asmita Calvillo D.O    PROCEDURE:   _  Shave Biopsy        _  Excisional Biopsy        _ x Punch (Size 5mm)    The area surrounding the skin lesion was prepared and draped in the  usual sterile manner. The lesion was removed in the usual manner by the  biopsy method noted above. Hemostasis was assured. The patient tolerated  the procedure well.    Closure:     _x  Monsel’s for hemostasis              x_  suture 2                   _  None    Followup: The patient tolerated the procedure well without  complications.  Standard post-procedure care is explained and return  precautions are given.    Mr#: [id number]      Past Medical History:   Diagnosis Date    ADHD (attention deficit hyperactivity disorder) 1998    Anxiety     Asthma     Depression     Psychotic disorder (HCC)        History reviewed. No pertinent surgical history.    Family History   Problem Relation Age of Onset    Hypertension Mother     Anxiety Disorder Mother     Anxiety Disorder Father     Alcohol Abuse Father        Allergies   Allergen Reactions    Codeine Nausea And Vomiting       Social History     Tobacco Use   Smoking Status Former    Current packs/day: 2.00    Average packs/day: 2.0 packs/day for 10.0 years (20.0 ttl pk-yrs)    Types: Cigarettes, E-Cigarettes   Smokeless Tobacco Never   Tobacco Comments    Smoked for 10 yrs, switched to vape, quit during pandemic       Social History     Substance and Sexual

## 2025-03-06 NOTE — PROGRESS NOTES
Marianela Mackenzie is a 34 y.o. female (: 1990) presenting to address:    Chief Complaint   Patient presents with    Follow-up     Mole Removal       Vitals:    25 1357   BP: 124/81   Pulse: 81   Resp: 16   Temp: 98.3 °F (36.8 °C)   SpO2: 98%       \"Have you been to the ER, urgent care clinic since your last visit?  Hospitalized since your last visit?\"    YES - When: approximately 3 months ago.  Where and Why: [facility].    “Have you seen or consulted any other health care providers outside of [facility] since your last visit?”    YES - When: approximately 2 months ago.  Where and Why: [facility] for Drug Screening.

## 2025-03-11 ENCOUNTER — TELEPHONE (OUTPATIENT)
Dept: FAMILY MEDICINE CLINIC | Facility: CLINIC | Age: 35
End: 2025-03-11

## 2025-03-12 ENCOUNTER — TELEPHONE (OUTPATIENT)
Dept: FAMILY MEDICINE CLINIC | Facility: CLINIC | Age: 35
End: 2025-03-12

## 2025-03-12 DIAGNOSIS — C43.71 MALIGNANT MELANOMA OF SKIN OF RIGHT LOWER EXTREMITY, INCLUDING HIP (HCC): Primary | ICD-10-CM

## 2025-03-12 NOTE — TELEPHONE ENCOUNTER
Returned patient's call this afternoon to inform her of biopsy pathology results of invasive melanoma. I placed an urgent referral to dermatology and discussed the plan with pt. All questions were answered. Pt to follow up for physical in the fall.

## 2025-03-17 ENCOUNTER — TELEPHONE (OUTPATIENT)
Dept: FAMILY MEDICINE CLINIC | Facility: CLINIC | Age: 35
End: 2025-03-17

## 2025-03-17 DIAGNOSIS — C43.71: Primary | ICD-10-CM

## 2025-03-17 NOTE — TELEPHONE ENCOUNTER
Dermatology calling after receiving a referral for pt but advised she needs to be referred to general surgery first before going to derm. They suggested Dr. Chandler with [facility].

## 2025-03-17 NOTE — TELEPHONE ENCOUNTER
Requesting referral be put in for General Surgery.    Dr Chandler  [facility]  [address],   [place], [place], [address]   Phone: [phone]

## 2025-03-20 ENCOUNTER — TELEPHONE (OUTPATIENT)
Dept: FAMILY MEDICINE CLINIC | Facility: CLINIC | Age: 35
End: 2025-03-20

## 2025-03-20 NOTE — TELEPHONE ENCOUNTER
Pt called wanting to confirm if her FMLA should be completed by Dr. Saavedra or the surgical office that will be doing her procedure. She can be reached at [phone].

## 2025-03-21 NOTE — TELEPHONE ENCOUNTER
Spoke with patient and informed her to take [facility] paperwork to surgeons office since they will be the ones taking her out of work. Patient expressed understanding.